# Patient Record
Sex: FEMALE | Race: WHITE | NOT HISPANIC OR LATINO | Employment: OTHER | URBAN - METROPOLITAN AREA
[De-identification: names, ages, dates, MRNs, and addresses within clinical notes are randomized per-mention and may not be internally consistent; named-entity substitution may affect disease eponyms.]

---

## 2023-02-24 ENCOUNTER — TELEPHONE (OUTPATIENT)
Dept: ORTHOPEDICS | Facility: CLINIC | Age: 83
End: 2023-02-24
Payer: MEDICARE

## 2023-02-24 DIAGNOSIS — M25.561 RIGHT KNEE PAIN, UNSPECIFIED CHRONICITY: Primary | ICD-10-CM

## 2023-02-27 ENCOUNTER — OFFICE VISIT (OUTPATIENT)
Dept: ORTHOPEDICS | Facility: CLINIC | Age: 83
End: 2023-02-27
Payer: MEDICARE

## 2023-02-27 ENCOUNTER — HOSPITAL ENCOUNTER (OUTPATIENT)
Dept: RADIOLOGY | Facility: HOSPITAL | Age: 83
Discharge: HOME | End: 2023-02-27
Attending: ORTHOPAEDIC SURGERY
Payer: MEDICARE

## 2023-02-27 VITALS — HEIGHT: 63 IN | WEIGHT: 130 LBS | BODY MASS INDEX: 23.04 KG/M2

## 2023-02-27 DIAGNOSIS — M25.561 RIGHT KNEE PAIN, UNSPECIFIED CHRONICITY: ICD-10-CM

## 2023-02-27 DIAGNOSIS — G89.29 CHRONIC PAIN OF RIGHT KNEE: Primary | ICD-10-CM

## 2023-02-27 DIAGNOSIS — M25.561 CHRONIC PAIN OF RIGHT KNEE: Primary | ICD-10-CM

## 2023-02-27 DIAGNOSIS — M17.11 PRIMARY OSTEOARTHRITIS OF RIGHT KNEE: ICD-10-CM

## 2023-02-27 PROCEDURE — 1159F PR MEDICATION LIST DOCUMENTED IN MEDICAL RECORD: ICD-10-PCS | Mod: CPTII,S$GLB,, | Performed by: ORTHOPAEDIC SURGERY

## 2023-02-27 PROCEDURE — 73564 X-RAY EXAM KNEE 4 OR MORE: CPT | Mod: 26,RT,, | Performed by: RADIOLOGY

## 2023-02-27 PROCEDURE — 99999 PR PBB SHADOW E&M-EST. PATIENT-LVL III: ICD-10-PCS | Mod: PBBFAC,,, | Performed by: ORTHOPAEDIC SURGERY

## 2023-02-27 PROCEDURE — 1101F PT FALLS ASSESS-DOCD LE1/YR: CPT | Mod: CPTII,S$GLB,, | Performed by: ORTHOPAEDIC SURGERY

## 2023-02-27 PROCEDURE — 1126F AMNT PAIN NOTED NONE PRSNT: CPT | Mod: CPTII,S$GLB,, | Performed by: ORTHOPAEDIC SURGERY

## 2023-02-27 PROCEDURE — 3288F PR FALLS RISK ASSESSMENT DOCUMENTED: ICD-10-PCS | Mod: CPTII,S$GLB,, | Performed by: ORTHOPAEDIC SURGERY

## 2023-02-27 PROCEDURE — 3288F FALL RISK ASSESSMENT DOCD: CPT | Mod: CPTII,S$GLB,, | Performed by: ORTHOPAEDIC SURGERY

## 2023-02-27 PROCEDURE — 1159F MED LIST DOCD IN RCRD: CPT | Mod: CPTII,S$GLB,, | Performed by: ORTHOPAEDIC SURGERY

## 2023-02-27 PROCEDURE — 99204 OFFICE O/P NEW MOD 45 MIN: CPT | Mod: S$GLB,,, | Performed by: ORTHOPAEDIC SURGERY

## 2023-02-27 PROCEDURE — 99204 PR OFFICE/OUTPT VISIT, NEW, LEVL IV, 45-59 MIN: ICD-10-PCS | Mod: S$GLB,,, | Performed by: ORTHOPAEDIC SURGERY

## 2023-02-27 PROCEDURE — 73564 XR KNEE ORTHO RIGHT WITH FLEXION: ICD-10-PCS | Mod: 26,RT,, | Performed by: RADIOLOGY

## 2023-02-27 PROCEDURE — 1101F PR PT FALLS ASSESS DOC 0-1 FALLS W/OUT INJ PAST YR: ICD-10-PCS | Mod: CPTII,S$GLB,, | Performed by: ORTHOPAEDIC SURGERY

## 2023-02-27 PROCEDURE — 1126F PR PAIN SEVERITY QUANTIFIED, NO PAIN PRESENT: ICD-10-PCS | Mod: CPTII,S$GLB,, | Performed by: ORTHOPAEDIC SURGERY

## 2023-02-27 PROCEDURE — 99999 PR PBB SHADOW E&M-EST. PATIENT-LVL III: CPT | Mod: PBBFAC,,, | Performed by: ORTHOPAEDIC SURGERY

## 2023-02-27 PROCEDURE — 73562 XR KNEE ORTHO RIGHT WITH FLEXION: ICD-10-PCS | Mod: 26,LT,, | Performed by: RADIOLOGY

## 2023-02-27 PROCEDURE — 73564 X-RAY EXAM KNEE 4 OR MORE: CPT | Mod: TC,RT

## 2023-02-27 PROCEDURE — 73562 X-RAY EXAM OF KNEE 3: CPT | Mod: 26,LT,, | Performed by: RADIOLOGY

## 2023-02-27 RX ORDER — ATORVASTATIN CALCIUM 10 MG/1
1 TABLET, FILM COATED ORAL DAILY
COMMUNITY
Start: 2022-05-20

## 2023-02-27 RX ORDER — ASPIRIN 81 MG/1
81 TABLET ORAL
COMMUNITY

## 2023-02-27 RX ORDER — LATANOPROST 50 UG/ML
SOLUTION/ DROPS OPHTHALMIC
COMMUNITY
Start: 2023-02-20

## 2023-02-27 RX ORDER — LEVOTHYROXINE SODIUM 25 UG/1
25 TABLET ORAL
COMMUNITY
Start: 2023-02-02

## 2023-02-27 RX ORDER — AMLODIPINE BESYLATE 2.5 MG/1
2.5 TABLET ORAL
COMMUNITY
Start: 2023-02-02

## 2023-02-27 RX ORDER — CHOLECALCIFEROL (VITAMIN D3) 25 MCG
1000 TABLET ORAL
COMMUNITY

## 2023-02-27 RX ORDER — DIPHENHYDRAMINE HCL 25 MG
25 CAPSULE ORAL NIGHTLY PRN
COMMUNITY

## 2023-02-27 RX ORDER — TRIAMCINOLONE ACETONIDE 1 MG/G
CREAM TOPICAL DAILY PRN
COMMUNITY

## 2023-02-27 RX ORDER — GUAIFENESIN 600 MG/1
600 TABLET, EXTENDED RELEASE ORAL DAILY PRN
COMMUNITY

## 2023-02-27 NOTE — PROGRESS NOTES
"      Patient ID: Jocelyn Hutton  YOB: 1940  MRN: [id number]    Chief Complaint: Pain of the Right Knee    Referred By: Makenzie Carvajal (current pt)    History of Present Illness: Jocelyn Hutton is a  82 y.o. female   retiree with a chief complaint of Pain of the Right Knee    Meghan is here today with right knee pain. She denies having any pain today. Her pain began 2/22/23 when she went to kneel down at [employer] and her knee popped. She has had pain since then and could not put full weight on her leg for a while. She has no prior hx of any sx, CSI, or PT. She denies any numbness, weakness, swelling, limited ROM. Her knee sometimes will still pop. Her pain is worsened by standing and kneeling. Her pain is alleviated by her knee "popping".     HPI    Past Medical History:   Past Medical History:   Diagnosis Date    Hypertension     Thyroid disease      History reviewed. No pertinent surgical history.  History reviewed. No pertinent family history.  Social History     Socioeconomic History    Marital status:    Tobacco Use    Smoking status: Never    Smokeless tobacco: Never   Substance and Sexual Activity    Alcohol use: Not Currently    Drug use: Never     Medication List with Changes/Refills   Current Medications    AMLODIPINE (NORVASC) 2.5 MG TABLET    Take 2.5 mg by mouth.    ASPIRIN (ECOTRIN) 81 MG EC TABLET    Take 81 mg by mouth.    ATORVASTATIN (LIPITOR) 10 MG TABLET    Take 1 tablet by mouth once daily.    DIPHENHYDRAMINE (BENADRYL) 25 MG CAPSULE    Take 25 mg by mouth nightly as needed.    GUAIFENESIN (MUCINEX) 600 MG 12 HR TABLET    Take 600 mg by mouth daily as needed.    LATANOPROST 0.005 % OPHTHALMIC SOLUTION    Place into both eyes.    LEVOTHYROXINE (SYNTHROID) 25 MCG TABLET    Take 25 mcg by mouth.    MULTIVITAMIN WITH MINERALS TABLET    Take 1 tablet by mouth once daily.    TRIAMCINOLONE ACETONIDE 0.1% (KENALOG) 0.1 % CREAM    Apply topically daily as needed.    VITAMIN D (VITAMIN D3) " 1000 UNITS TAB    Take 1,000 Units by mouth.     Review of patient's allergies indicates:  No Known Allergies  ROS    Physical Exam:   Body mass index is 23.03 kg/m².  There were no vitals filed for this visit.   GENERAL: Well appearing, appropriate for stated age, no acute distress.  CARDIOVASCULAR: Pulses regular by peripheral palpation.  PULMONARY: Respirations are even and non-labored.  NEURO: Awake, alert, and oriented x 3.  PSYCH: Mood & affect are appropriate.  HEENT: Head is normocephalic and atraumatic.            Right Knee Exam     Inspection   Effusion: absent    Tenderness   The patient is tender to palpation of the lateral joint line.    Crepitus   The patient has crepitus of the patella and lateral joint line.    Range of Motion   Extension:  0   Flexion:  100     Tests   Meniscus   Miguel:   Lateral - positive    Other   Sensation: normal    Comments:  Intact EHL, FHL, gastrocsoleus, and tibialis anterior. Sensation intact to light touch in superficial peroneal, deep peroneal, tibial, sural, and saphenous nerve distributions. Foot warm and well perfused with capillary refill of less than 2 seconds and palpable pedal pulses.      Muscle Strength   Right Lower Extremity   Hip Abduction: 5/5   Quadriceps:  5/5   Hamstrin/5     Vascular Exam     Right Pulses  Dorsalis Pedis:      2+  Posterior Tibial:      2+        Imaging:    X-ray Knee Ortho Right with Flexion  Narrative: EXAMINATION:  XR KNEE ORTHO RIGHT WITH FLEXION    CLINICAL HISTORY:  Pain in right knee    TECHNIQUE:  AP standing as well as PA flexion standing and Merchant views of both knees were performed.  A lateral view of the right knee is also performed.    COMPARISON:  None.    FINDINGS:  Flexion standing views demonstrate moderate lateral compartment joint space narrowing and osteophytic lipping bilaterally.  Minimal lateral tilting bilaterally on patellar views.  Joint spaces maintained.  Soft tissues normal.  Impression:  Degenerative change as above.    Electronically signed by: Sergey Braga MD  Date:    02/27/2023  Time:    09:13      Relevant imaging results reviewed and interpreted by me, discussed with the patient and / or family today.     Other Tests:         Patient Instructions   Assessment:  Jocelyn Hutton is a  82 y.o. female   retiree with a chief complaint of Pain of the Right Knee    Chronic right knee pain with mechanical symptoms     Encounter Diagnoses   Name Primary?    Chronic pain of right knee Yes    Primary osteoarthritis of right knee       Plan:  MRI of right knee in [facility]     Follow-up: AFTER MRI or sooner if there are any problems between now and then.    Leave Review:   Google: Leave Google Review  Healthgrades: Leave Healthgrades Review    After Hours Number: [phone]       Provider Note/Medical Decision Making:       I discussed worrisome and red flag signs and symptoms with the patient. The patient expressed understanding and agreed to alert me immediately or to go to the emergency room if they experience any of these.   Treatment plan was developed with input from the patient/family, and they expressed understanding and agreement with the plan. All questions were answered today.          Chapincito Hoffmann MD  Orthopaedic Surgery & Sports Medicine       Disclaimer: This note was prepared using a voice recognition system and is likely to have sound alike errors within the text.

## 2023-02-27 NOTE — PATIENT INSTRUCTIONS
Assessment:  Jocelyn Hutton is a  82 y.o. female   retiree with a chief complaint of Pain of the Right Knee    Chronic right knee pain with mechanical symptoms     Encounter Diagnoses   Name Primary?    Chronic pain of right knee Yes    Primary osteoarthritis of right knee       Plan:  MRI of right knee in [facility]     Follow-up: AFTER MRI or sooner if there are any problems between now and then.    Leave Review:   Google: Leave Google Review  Healthgrades: Leave Healthgrades Review    After Hours Number: [phone]

## 2023-02-27 NOTE — PROGRESS NOTES
"      Patient ID: Jocelyn Hutton  YOB: 1940  MRN: [id number]    Chief Complaint: Pain of the Right Knee      Referred By: Makenzie Carvajal (current pt)    History of Present Illness: Jocelyn Hutton is a  82 y.o. female   retiree with a chief complaint of Pain of the Right Knee    Meghan is here today with right knee pain. She denies having any pain today. Her pain began 2/22/23 when she went to kneel down at [employer] and her knee popped. She has had pain since then and could not put full weight on her leg for a while. She has no prior hx of any sx, CSI, or PT. She denies any numbness, weakness, swelling, limited ROM. Her knee sometimes will still pop. Her pain is worsened by standing and kneeling. Her pain is alleviated by her knee "popping".     HPI    Past Medical History:   Past Medical History:   Diagnosis Date    Hypertension     Thyroid disease      History reviewed. No pertinent surgical history.  History reviewed. No pertinent family history.  Social History     Socioeconomic History    Marital status:    Tobacco Use    Smoking status: Never    Smokeless tobacco: Never   Substance and Sexual Activity    Alcohol use: Not Currently    Drug use: Never     Medication List with Changes/Refills   Current Medications    AMLODIPINE (NORVASC) 2.5 MG TABLET    Take 2.5 mg by mouth.    ASPIRIN (ECOTRIN) 81 MG EC TABLET    Take 81 mg by mouth.    ATORVASTATIN (LIPITOR) 10 MG TABLET    Take 1 tablet by mouth once daily.    DIPHENHYDRAMINE (BENADRYL) 25 MG CAPSULE    Take 25 mg by mouth nightly as needed.    GUAIFENESIN (MUCINEX) 600 MG 12 HR TABLET    Take 600 mg by mouth daily as needed.    LATANOPROST 0.005 % OPHTHALMIC SOLUTION    Place into both eyes.    LEVOTHYROXINE (SYNTHROID) 25 MCG TABLET    Take 25 mcg by mouth.    MULTIVITAMIN WITH MINERALS TABLET    Take 1 tablet by mouth once daily.    TRIAMCINOLONE ACETONIDE 0.1% (KENALOG) 0.1 % CREAM    Apply topically daily as needed.    VITAMIN D (VITAMIN D3) " 1000 UNITS TAB    Take 1,000 Units by mouth.     Review of patient's allergies indicates:  No Known Allergies  ROS    Physical Exam:   Body mass index is 23.03 kg/m².  There were no vitals filed for this visit.   GENERAL: Well appearing, appropriate for stated age, no acute distress.  CARDIOVASCULAR: Pulses regular by peripheral palpation.  PULMONARY: Respirations are even and non-labored.  NEURO: Awake, alert, and oriented x 3.  PSYCH: Mood & affect are appropriate.  HEENT: Head is normocephalic and atraumatic.  Ortho/SPM Exam  ***    Imaging:    X-ray Knee Ortho Right with Flexion  Narrative: EXAMINATION:  XR KNEE ORTHO RIGHT WITH FLEXION    CLINICAL HISTORY:  Pain in right knee    TECHNIQUE:  AP standing as well as PA flexion standing and Merchant views of both knees were performed.  A lateral view of the right knee is also performed.    COMPARISON:  None.    FINDINGS:  Flexion standing views demonstrate moderate lateral compartment joint space narrowing and osteophytic lipping bilaterally.  Minimal lateral tilting bilaterally on patellar views.  Joint spaces maintained.  Soft tissues normal.  Impression: Degenerative change as above.    Electronically signed by: Sergey Braga MD  Date:    02/27/2023  Time:    09:13    ***  Relevant imaging results reviewed and interpreted by me, discussed with the patient and / or family today. ***    Other Tests:     ***    There are no Patient Instructions on file for this visit.  Provider Note/Medical Decision Making: ***      I discussed worrisome and red flag signs and symptoms with the patient. The patient expressed understanding and agreed to alert me immediately or to go to the emergency room if they experience any of these.   Treatment plan was developed with input from the patient/family, and they expressed understanding and agreement with the plan. All questions were answered today.          Chapincito Hoffmann MD  Orthopaedic Surgery & Sports Medicine        Disclaimer: This note was prepared using a voice recognition system and is likely to have sound alike errors within the text.

## 2023-03-03 ENCOUNTER — HOSPITAL ENCOUNTER (OUTPATIENT)
Dept: RADIOLOGY | Facility: HOSPITAL | Age: 83
Discharge: HOME | End: 2023-03-03
Attending: ORTHOPAEDIC SURGERY
Payer: MEDICARE

## 2023-03-03 DIAGNOSIS — M25.561 CHRONIC PAIN OF RIGHT KNEE: ICD-10-CM

## 2023-03-03 DIAGNOSIS — G89.29 CHRONIC PAIN OF RIGHT KNEE: ICD-10-CM

## 2023-03-03 PROCEDURE — 73721 MRI KNEE WITHOUT CONTRAST RIGHT: ICD-10-PCS | Mod: 26,RT,, | Performed by: RADIOLOGY

## 2023-03-03 PROCEDURE — 73721 MRI JNT OF LWR EXTRE W/O DYE: CPT | Mod: 26,RT,, | Performed by: RADIOLOGY

## 2023-03-03 PROCEDURE — 73721 MRI JNT OF LWR EXTRE W/O DYE: CPT | Mod: TC,PO,RT

## 2023-03-06 ENCOUNTER — OFFICE VISIT (OUTPATIENT)
Dept: ORTHOPEDICS | Facility: CLINIC | Age: 83
End: 2023-03-06
Payer: MEDICARE

## 2023-03-06 DIAGNOSIS — M17.11 PRIMARY OSTEOARTHRITIS OF RIGHT KNEE: Primary | ICD-10-CM

## 2023-03-06 DIAGNOSIS — S83.271D COMPLEX TEAR OF LATERAL MENISCUS OF RIGHT KNEE AS CURRENT INJURY, SUBSEQUENT ENCOUNTER: ICD-10-CM

## 2023-03-06 DIAGNOSIS — G89.29 CHRONIC PAIN OF RIGHT KNEE: ICD-10-CM

## 2023-03-06 DIAGNOSIS — M25.561 CHRONIC PAIN OF RIGHT KNEE: ICD-10-CM

## 2023-03-06 PROCEDURE — 99214 OFFICE O/P EST MOD 30 MIN: CPT | Mod: S$GLB,,, | Performed by: ORTHOPAEDIC SURGERY

## 2023-03-06 PROCEDURE — 1101F PT FALLS ASSESS-DOCD LE1/YR: CPT | Mod: CPTII,S$GLB,, | Performed by: ORTHOPAEDIC SURGERY

## 2023-03-06 PROCEDURE — 99214 PR OFFICE/OUTPT VISIT, EST, LEVL IV, 30-39 MIN: ICD-10-PCS | Mod: S$GLB,,, | Performed by: ORTHOPAEDIC SURGERY

## 2023-03-06 PROCEDURE — 3288F FALL RISK ASSESSMENT DOCD: CPT | Mod: CPTII,S$GLB,, | Performed by: ORTHOPAEDIC SURGERY

## 2023-03-06 PROCEDURE — 1159F PR MEDICATION LIST DOCUMENTED IN MEDICAL RECORD: ICD-10-PCS | Mod: CPTII,S$GLB,, | Performed by: ORTHOPAEDIC SURGERY

## 2023-03-06 PROCEDURE — 1101F PR PT FALLS ASSESS DOC 0-1 FALLS W/OUT INJ PAST YR: ICD-10-PCS | Mod: CPTII,S$GLB,, | Performed by: ORTHOPAEDIC SURGERY

## 2023-03-06 PROCEDURE — 3288F PR FALLS RISK ASSESSMENT DOCUMENTED: ICD-10-PCS | Mod: CPTII,S$GLB,, | Performed by: ORTHOPAEDIC SURGERY

## 2023-03-06 PROCEDURE — 99999 PR PBB SHADOW E&M-EST. PATIENT-LVL III: ICD-10-PCS | Mod: PBBFAC,,, | Performed by: ORTHOPAEDIC SURGERY

## 2023-03-06 PROCEDURE — 99999 PR PBB SHADOW E&M-EST. PATIENT-LVL III: CPT | Mod: PBBFAC,,, | Performed by: ORTHOPAEDIC SURGERY

## 2023-03-06 PROCEDURE — 1159F MED LIST DOCD IN RCRD: CPT | Mod: CPTII,S$GLB,, | Performed by: ORTHOPAEDIC SURGERY

## 2023-03-06 NOTE — PATIENT INSTRUCTIONS
Assessment:  Jocelyn Hutton is a  82 y.o. 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    Primary osteoarthritis of right knee Yes    Chronic pain of right knee     Complex tear of lateral meniscus of right knee as current injury, subsequent encounter         Plan:  Defer corticosteroid injection today  Discussed surgical options but at this point recommend non-operative management as patient's symptoms and activity level have improved  I do think she will benefit from PT and we will get her a script to [facility] in [place] where she has had good experiences before  Discussed activity modifications and avoiding things that worsen pain and cause popping    Follow-up: 2 months with Jodi Holliday PA-C or sooner if there are any problems between now and then.    Leave Review:   Google: Leave Google Review  Healthgrades: Leave Healthgrades Review    After Hours Number: [phone]

## 2023-03-06 NOTE — PROGRESS NOTES
"      Patient ID: Jocelyn Hutton  YOB: 1940  MRN: [id number]    Chief Complaint: Follow-up of the Right Knee    Referred By: Makenzie Carvajal (current pt)/Sudhakar cooley    History of Present Illness: Joeclyn Hutton is a  82 y.o. 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 She reports no changes in her knee pain. She reports no new popping, but has had popping randomly of her knee for years.     Chacha is here today with right knee pain. She denies having any pain today. Her pain began 2/22/23 when she went to kneel down at [employer] and her knee popped. She has had pain since then and could not put full weight on her leg for a while. She has no prior hx of any sx, CSI, or PT. She denies any numbness, weakness, swelling, limited ROM. Her knee sometimes will still pop. Her pain is worsened by standing and kneeling. Her pain is alleviated by her knee "popping".     HPI    Past Medical History:   Past Medical History:   Diagnosis Date    Hypertension     Thyroid disease      History reviewed. No pertinent surgical history.  History reviewed. No pertinent family history.  Social History     Socioeconomic History    Marital status:    Tobacco Use    Smoking status: Never    Smokeless tobacco: Never   Substance and Sexual Activity    Alcohol use: Not Currently    Drug use: Never     Medication List with Changes/Refills   Current Medications    AMLODIPINE (NORVASC) 2.5 MG TABLET    Take 2.5 mg by mouth.    ASPIRIN (ECOTRIN) 81 MG EC TABLET    Take 81 mg by mouth.    ATORVASTATIN (LIPITOR) 10 MG TABLET    Take 1 tablet by mouth once daily.    DIPHENHYDRAMINE (BENADRYL) 25 MG CAPSULE    Take 25 mg by mouth nightly as needed.    GUAIFENESIN (MUCINEX) 600 MG 12 HR TABLET    Take 600 mg by mouth daily as needed.    LATANOPROST 0.005 % OPHTHALMIC SOLUTION    Place into both eyes.    LEVOTHYROXINE (SYNTHROID) 25 MCG " TABLET    Take 25 mcg by mouth.    MULTIVITAMIN WITH MINERALS TABLET    Take 1 tablet by mouth once daily.    TRIAMCINOLONE ACETONIDE 0.1% (KENALOG) 0.1 % CREAM    Apply topically daily as needed.    VITAMIN D (VITAMIN D3) 1000 UNITS TAB    Take 1,000 Units by mouth.     Review of patient's allergies indicates:  No Known Allergies  ROS    Physical Exam:   There is no height or weight on file to calculate BMI.  There were no vitals filed for this visit.   GENERAL: Well appearing, appropriate for stated age, no acute distress.  CARDIOVASCULAR: Pulses regular by peripheral palpation.  PULMONARY: Respirations are even and non-labored.  NEURO: Awake, alert, and oriented x 3.  PSYCH: Mood & affect are appropriate.  HEENT: Head is normocephalic and atraumatic.            Right Knee Exam     Inspection   Effusion: absent    Tenderness   The patient is tender to palpation of the lateral joint line.    Crepitus   The patient has crepitus of the patella and lateral joint line.    Range of Motion   Extension:  0   Flexion:  100     Tests   Meniscus   Miguel:   Lateral - positive    Other   Sensation: normal    Comments:  Intact EHL, FHL, gastrocsoleus, and tibialis anterior. Sensation intact to light touch in superficial peroneal, deep peroneal, tibial, sural, and saphenous nerve distributions. Foot warm and well perfused with capillary refill of less than 2 seconds and palpable pedal pulses.      Muscle Strength   Right Lower Extremity   Hip Abduction: 5/5   Quadriceps:  5/5   Hamstrin/5     Vascular Exam     Right Pulses  Dorsalis Pedis:      2+  Posterior Tibial:      2+        Imaging:    MRI Knee Without Contrast Right  Narrative: EXAMINATION:  MRI KNEE WITHOUT CONTRAST RIGHT    CLINICAL HISTORY:  Meniscal tear, untreated, new symptoms;Pain in right knee    TECHNIQUE:  Multiplanar, multisequence images were performed about the right knee.  No contrast was administered.    COMPARISON:  None    FINDINGS:  Cruciate  ligaments and collateral ligaments are intact.  Normal extensor mechanism.    Mild intrasubstance degenerative signal the posterior and body medial meniscus.  No discrete tear.    Low-grade free edge blunting/radial tearing posterior horn lateral meniscus.  Degenerative free edge radial tearing throughout the body lateral meniscus which is extruded beyond the joint line.  Degenerate maceration of the anterior horn.    Mild chondral thinning throughout the weight-bearing surface of the medial compartment.  Full-thickness chondral erosion posterior weight-bearing surface lateral femoral condyle measures 2 cm.  Adjacent full-thickness chondral erosion posterolateral tibial plateau measures 1.6 cm.    Patellofemoral articular cartilage well-preserved.    Small joint effusion.  Small Baker's cyst.  No intra-articular loose body.  Bony architecture marrow signal normal.  Supporting soft tissues and musculature are normal.  Impression: 1. Free edge degenerative radial tearing posterior horn and body lateral meniscus, which is extruded beyond the lateral joint line.  2. Full-thickness chondral erosions posterior weight-bearing surfaces of the lateral compartment.  3. Small effusion.  Small Baker's cyst.    Electronically signed by: Kennedy Hartman  Date:    03/03/2023  Time:    16:53      Relevant imaging results reviewed and interpreted by me, discussed with the patient and / or family today.     Other Tests:         Patient Instructions   Assessment:  Jocelyn Hutton is a  82 y.o. 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    Primary osteoarthritis of right knee Yes    Chronic pain of right knee     Complex tear of lateral meniscus of right knee as current injury, subsequent encounter         Plan:  Defer corticosteroid  injection today  Discussed surgical options but at this point recommend non-operative management as patient's symptoms and activity level have improved  I do think she will benefit from PT and we will get her a script to [facility] in [place] where she has had good experiences before  Discussed activity modifications and avoiding things that worsen pain and cause popping    Follow-up: 2 months with Jodi Holliday PA-C or sooner if there are any problems between now and then.    Leave Review:   Google: Leave Google Review  Healthgrades: Leave Healthgrades Review    After Hours Number: [phone]       Provider Note/Medical Decision Making:  Patient has had some mechanical symptoms specifically laterally in her knee which are consistent with her findings of meniscus tear on that side.  She has significant chondrosis on that side too.  She states that since the last time she saw she is actually had no pain and mechanical symptoms she feels like she is doing much better.  In this case we did discuss surgery but it did not think surgery is indicated if she is completely asymptomatic.  We talked about her cartilage loss and arthritis and I do think she would benefit from some physical therapy.  She is going to modify her activities to try to avoid the popping and the pain and see how she does.  We could consider corticosteroid injection in the future which she deferred today but may have a role depending on how she does.      I discussed worrisome and red flag signs and symptoms with the patient. The patient expressed understanding and agreed to alert me immediately or to go to the emergency room if they experience any of these.   Treatment plan was developed with input from the patient/family, and they expressed understanding and agreement with the plan. All questions were answered today.          Chapincito Hoffmann MD  Orthopaedic Surgery & Sports Medicine       Disclaimer: This note was prepared using a voice  recognition system and is likely to have sound alike errors within the text.       I, Milagros Denny, acted as a scribe for Chapincito Hoffmann MD for the duration of this office visit.

## 2023-05-08 ENCOUNTER — OFFICE VISIT (OUTPATIENT)
Dept: ORTHOPEDICS | Facility: CLINIC | Age: 83
End: 2023-05-08
Payer: MEDICARE

## 2023-05-08 VITALS — WEIGHT: 130.06 LBS | BODY MASS INDEX: 23.04 KG/M2 | HEIGHT: 63 IN

## 2023-05-08 DIAGNOSIS — G89.29 CHRONIC PAIN OF RIGHT KNEE: ICD-10-CM

## 2023-05-08 DIAGNOSIS — M17.11 PRIMARY OSTEOARTHRITIS OF RIGHT KNEE: Primary | ICD-10-CM

## 2023-05-08 DIAGNOSIS — M25.561 CHRONIC PAIN OF RIGHT KNEE: ICD-10-CM

## 2023-05-08 DIAGNOSIS — S83.271D COMPLEX TEAR OF LATERAL MENISCUS OF RIGHT KNEE AS CURRENT INJURY, SUBSEQUENT ENCOUNTER: ICD-10-CM

## 2023-05-08 DIAGNOSIS — M25.561 RIGHT KNEE PAIN, UNSPECIFIED CHRONICITY: ICD-10-CM

## 2023-05-08 PROCEDURE — 99213 OFFICE O/P EST LOW 20 MIN: CPT | Mod: S$GLB,,, | Performed by: PHYSICIAN ASSISTANT

## 2023-05-08 PROCEDURE — 1101F PT FALLS ASSESS-DOCD LE1/YR: CPT | Mod: CPTII,S$GLB,, | Performed by: PHYSICIAN ASSISTANT

## 2023-05-08 PROCEDURE — 3288F PR FALLS RISK ASSESSMENT DOCUMENTED: ICD-10-PCS | Mod: CPTII,S$GLB,, | Performed by: PHYSICIAN ASSISTANT

## 2023-05-08 PROCEDURE — 1159F PR MEDICATION LIST DOCUMENTED IN MEDICAL RECORD: ICD-10-PCS | Mod: CPTII,S$GLB,, | Performed by: PHYSICIAN ASSISTANT

## 2023-05-08 PROCEDURE — 1101F PR PT FALLS ASSESS DOC 0-1 FALLS W/OUT INJ PAST YR: ICD-10-PCS | Mod: CPTII,S$GLB,, | Performed by: PHYSICIAN ASSISTANT

## 2023-05-08 PROCEDURE — 1126F AMNT PAIN NOTED NONE PRSNT: CPT | Mod: CPTII,S$GLB,, | Performed by: PHYSICIAN ASSISTANT

## 2023-05-08 PROCEDURE — 99999 PR PBB SHADOW E&M-EST. PATIENT-LVL III: CPT | Mod: PBBFAC,,, | Performed by: PHYSICIAN ASSISTANT

## 2023-05-08 PROCEDURE — 99999 PR PBB SHADOW E&M-EST. PATIENT-LVL III: ICD-10-PCS | Mod: PBBFAC,,, | Performed by: PHYSICIAN ASSISTANT

## 2023-05-08 PROCEDURE — 3288F FALL RISK ASSESSMENT DOCD: CPT | Mod: CPTII,S$GLB,, | Performed by: PHYSICIAN ASSISTANT

## 2023-05-08 PROCEDURE — 99213 PR OFFICE/OUTPT VISIT, EST, LEVL III, 20-29 MIN: ICD-10-PCS | Mod: S$GLB,,, | Performed by: PHYSICIAN ASSISTANT

## 2023-05-08 PROCEDURE — 1159F MED LIST DOCD IN RCRD: CPT | Mod: CPTII,S$GLB,, | Performed by: PHYSICIAN ASSISTANT

## 2023-05-08 PROCEDURE — 1160F PR REVIEW ALL MEDS BY PRESCRIBER/CLIN PHARMACIST DOCUMENTED: ICD-10-PCS | Mod: CPTII,S$GLB,, | Performed by: PHYSICIAN ASSISTANT

## 2023-05-08 PROCEDURE — 1126F PR PAIN SEVERITY QUANTIFIED, NO PAIN PRESENT: ICD-10-PCS | Mod: CPTII,S$GLB,, | Performed by: PHYSICIAN ASSISTANT

## 2023-05-08 PROCEDURE — 1160F RVW MEDS BY RX/DR IN RCRD: CPT | Mod: CPTII,S$GLB,, | Performed by: PHYSICIAN ASSISTANT

## 2023-05-08 RX ORDER — DICLOFENAC SODIUM 10 MG/G
2 GEL TOPICAL 3 TIMES DAILY
Qty: 1 EACH | Refills: 1 | Status: SHIPPED | OUTPATIENT
Start: 2023-05-08

## 2023-05-08 NOTE — PATIENT INSTRUCTIONS
Assessment:  Jocelyn Hutton is a  82 y.o. 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    Primary osteoarthritis of right knee Yes    Chronic pain of right knee     Complex tear of lateral meniscus of right knee as current injury, subsequent encounter     Right knee pain, unspecified chronicity         Plan:  Continue at home exercises  Discussed if continue mechanical symptoms recheck or new pain of OA.   Recommended visco but deferred at this time    Follow-up: PRN or sooner if there are any problems between now and then.    Leave Review:   Google: Leave Google Review  Healthgrades: Leave Healthgrades Review    After Hours Number: [phone]

## 2023-05-08 NOTE — PROGRESS NOTES
Patient ID: Jocelyn Hutton  YOB: 1940  MRN: [id number]    Chief Complaint: Pain of the Right Knee      Referred By: Dr. Hoffmann    History of Present Illness: Jocelyn Hutton is a  82 y.o. 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 She has completed six weeks of physical therapy at [facility]. She feels like it has help a lot she reports a pain level of 0 out of 10. She says in physical therapy they discovered as long as she doesn't flex her knee to far backwards she can avoid most pain and flare ups.     HPI    Past Medical History:   Past Medical History:   Diagnosis Date    Hypertension     Thyroid disease      History reviewed. No pertinent surgical history.  History reviewed. No pertinent family history.  Social History     Socioeconomic History    Marital status:    Tobacco Use    Smoking status: Never    Smokeless tobacco: Never   Substance and Sexual Activity    Alcohol use: Not Currently    Drug use: Never    Sexual activity: Not Currently     Medication List with Changes/Refills   New Medications    DICLOFENAC SODIUM (VOLTAREN) 1 % GEL    Apply 2 g topically 3 (three) times daily.   Current Medications    AMLODIPINE (NORVASC) 2.5 MG TABLET    Take 2.5 mg by mouth.    ASPIRIN (ECOTRIN) 81 MG EC TABLET    Take 81 mg by mouth.    ATORVASTATIN (LIPITOR) 10 MG TABLET    Take 1 tablet by mouth once daily.    DIPHENHYDRAMINE (BENADRYL) 25 MG CAPSULE    Take 25 mg by mouth nightly as needed.    GUAIFENESIN (MUCINEX) 600 MG 12 HR TABLET    Take 600 mg by mouth daily as needed.    LATANOPROST 0.005 % OPHTHALMIC SOLUTION    Place into both eyes.    LEVOTHYROXINE (SYNTHROID) 25 MCG TABLET    Take 25 mcg by mouth.    MULTIVITAMIN WITH MINERALS TABLET    Take 1 tablet by mouth once daily.    TRIAMCINOLONE ACETONIDE 0.1% (KENALOG) 0.1 % CREAM    Apply topically daily as  needed.    VITAMIN D (VITAMIN D3) 1000 UNITS TAB    Take 1,000 Units by mouth.     Review of patient's allergies indicates:   Allergen Reactions    Brimonidine Rash     Review of Systems   Constitutional: Negative for chills and fever.   HENT:  Negative for sore throat.    Eyes:  Negative for pain.   Cardiovascular:  Negative for chest pain and leg swelling.   Respiratory:  Negative for cough and shortness of breath.    Skin:  Negative for itching and rash.   Musculoskeletal:  Positive for joint pain and joint swelling.   Gastrointestinal:  Negative for abdominal pain, nausea and vomiting.   Genitourinary:  Negative for dysuria.   Neurological:  Negative for dizziness, numbness and paresthesias.     Physical Exam:   Body mass index is 23.04 kg/m².  There were no vitals filed for this visit.   GENERAL: Well appearing, appropriate for stated age, no acute distress.  CARDIOVASCULAR: Pulses regular by peripheral palpation.  PULMONARY: Respirations are even and non-labored.  NEURO: Awake, alert, and oriented x 3.  PSYCH: Mood & affect are appropriate.  HEENT: Head is normocephalic and atraumatic.  General    Nursing note and vitals reviewed.          Right Knee Exam   Right knee exam is normal.    Inspection   Effusion: absent    Tenderness   The patient is tender to palpation of the medial joint line.    Crepitus   The patient has crepitus of the patella.    Range of Motion   Extension:  0   Flexion:  120     Tests   Ligament Examination   Lachman: normal (-1 to 2mm)   PCL-Posterior Drawer: normal (0 to 2mm)     MCL - Valgus: normal (0 to 2mm)  LCL - Varus: normal    Other   Sensation: normal    Left Knee Exam   Left knee exam is normal.    Inspection   Effusion: absent    Tenderness   The patient is experiencing no tenderness.     Crepitus   The patient has crepitus of the patella.    Range of Motion   Extension:  0   Flexion:  120     Tests   Stability   Lachman: normal (-1 to 2mm)   PCL-Posterior Drawer: normal (0 to  2mm)  MCL - Valgus: normal (0 to 2mm)  LCL - Varus: normal (0 to 2mm)    Other   Sensation: normal    Muscle Strength   Right Lower Extremity   Hip Abduction: 5/5   Quadriceps:  5/5   Hamstrin/5   Left Lower Extremity   Hip Abduction: 5/5   Quadriceps:  5/5   Hamstrin/5     Vascular Exam     Right Pulses  Dorsalis Pedis:      2+  Posterior Tibial:      2+        Left Pulses  Dorsalis Pedis:      2+  Posterior Tibial:      2+      All compartments are soft and compressible. Calf soft non-tender. Intact EHL, FHL, gastroc soleus, and tibialis anterior. Sensation intact to light touch in superficial peroneal, deep peroneal, tibial, sural, and saphenous nerve distributions. Foot warm and well perfused with capillary refill of less than 2 seconds and palpable pedal pulses.      Imaging:    MRI Knee Without Contrast Right  Narrative: EXAMINATION:  MRI KNEE WITHOUT CONTRAST RIGHT    CLINICAL HISTORY:  Meniscal tear, untreated, new symptoms;Pain in right knee    TECHNIQUE:  Multiplanar, multisequence images were performed about the right knee.  No contrast was administered.    COMPARISON:  None    FINDINGS:  Cruciate ligaments and collateral ligaments are intact.  Normal extensor mechanism.    Mild intrasubstance degenerative signal the posterior and body medial meniscus.  No discrete tear.    Low-grade free edge blunting/radial tearing posterior horn lateral meniscus.  Degenerative free edge radial tearing throughout the body lateral meniscus which is extruded beyond the joint line.  Degenerate maceration of the anterior horn.    Mild chondral thinning throughout the weight-bearing surface of the medial compartment.  Full-thickness chondral erosion posterior weight-bearing surface lateral femoral condyle measures 2 cm.  Adjacent full-thickness chondral erosion posterolateral tibial plateau measures 1.6 cm.    Patellofemoral articular cartilage well-preserved.    Small joint effusion.  Small Baker's cyst.  No  intra-articular loose body.  Bony architecture marrow signal normal.  Supporting soft tissues and musculature are normal.  Impression: 1. Free edge degenerative radial tearing posterior horn and body lateral meniscus, which is extruded beyond the lateral joint line.  2. Full-thickness chondral erosions posterior weight-bearing surfaces of the lateral compartment.  3. Small effusion.  Small Baker's cyst.    Electronically signed by: Kennedy Hartman  Date:    03/03/2023  Time:    16:53      Relevant imaging results reviewed and interpreted by me, discussed with the patient and / or family today.     Other Tests:       Patient Instructions   Assessment:  Jocelyn Hutton is a  82 y.o. 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    Primary osteoarthritis of right knee Yes    Chronic pain of right knee     Complex tear of lateral meniscus of right knee as current injury, subsequent encounter     Right knee pain, unspecified chronicity         Plan:  Continue at home exercises  Discussed if continue mechanical symptoms recheck or new pain of OA.   Recommended visco but deferred at this time    Follow-up: PRN or sooner if there are any problems between now and then.    Leave Review:   Google: Leave Google Review  Healthgrades: Leave Healthgrades Review    After Hours Number: [phone]      Provider Note/Medical Decision Making:     I discussed worrisome and red flag signs and symptoms with the patient. The patient expressed understanding and agreed to alert me immediately or to go to the emergency room if they experience any of these.   Treatment plan was developed with input from the patient/family, and they expressed understanding and agreement with the plan. All questions were answered today.        Disclaimer: This note was prepared using a  voice recognition system and is likely to have sound alike errors within the text.

## 2025-03-03 ENCOUNTER — HOSPITAL ENCOUNTER (OUTPATIENT)
Dept: RADIOLOGY | Facility: HOSPITAL | Age: 85
Discharge: HOME | End: 2025-03-03
Attending: ORTHOPAEDIC SURGERY
Payer: MEDICARE

## 2025-03-03 ENCOUNTER — OFFICE VISIT (OUTPATIENT)
Dept: SPORTS MEDICINE | Facility: CLINIC | Age: 85
End: 2025-03-03
Payer: MEDICARE

## 2025-03-03 VITALS — BODY MASS INDEX: 23.04 KG/M2 | HEIGHT: 63 IN | WEIGHT: 130 LBS
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
## 2025-03-03 DIAGNOSIS — M25.562 ACUTE PAIN OF BOTH KNEES: ICD-10-CM

## 2025-03-03 DIAGNOSIS — M25.561 ACUTE PAIN OF BOTH KNEES: ICD-10-CM

## 2025-03-03 DIAGNOSIS — M25.562 PAIN IN BOTH KNEES, UNSPECIFIED CHRONICITY: ICD-10-CM

## 2025-03-03 DIAGNOSIS — S82.035A CLOSED NONDISPLACED TRANSVERSE FRACTURE OF LEFT PATELLA, INITIAL ENCOUNTER: ICD-10-CM

## 2025-03-03 DIAGNOSIS — S82.034A CLOSED NONDISPLACED TRANSVERSE FRACTURE OF RIGHT PATELLA, INITIAL ENCOUNTER: Primary | ICD-10-CM

## 2025-03-03 DIAGNOSIS — M25.561 PAIN IN BOTH KNEES, UNSPECIFIED CHRONICITY: ICD-10-CM

## 2025-03-03 PROCEDURE — 99999 PR PBB SHADOW E&M-EST. PATIENT-LVL III: CPT | Mod: PBBFAC,,, | Performed by: ORTHOPAEDIC SURGERY

## 2025-03-03 PROCEDURE — 73564 X-RAY EXAM KNEE 4 OR MORE: CPT | Mod: 26,50,, | Performed by: RADIOLOGY

## 2025-03-03 PROCEDURE — 73564 X-RAY EXAM KNEE 4 OR MORE: CPT | Mod: TC,50,PN

## 2025-03-03 PROCEDURE — 99215 OFFICE O/P EST HI 40 MIN: CPT | Mod: S$GLB,,, | Performed by: ORTHOPAEDIC SURGERY

## 2025-03-03 PROCEDURE — 3288F FALL RISK ASSESSMENT DOCD: CPT | Mod: CPTII,S$GLB,, | Performed by: ORTHOPAEDIC SURGERY

## 2025-03-03 PROCEDURE — 1101F PT FALLS ASSESS-DOCD LE1/YR: CPT | Mod: CPTII,S$GLB,, | Performed by: ORTHOPAEDIC SURGERY

## 2025-03-03 PROCEDURE — 1125F AMNT PAIN NOTED PAIN PRSNT: CPT | Mod: CPTII,S$GLB,, | Performed by: ORTHOPAEDIC SURGERY

## 2025-03-03 NOTE — PATIENT INSTRUCTIONS
Assessment:  Jocelyn Hutton is a  84 y.o. 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    Acute pain of both knees     Closed nondisplaced transverse fracture of right patella, initial encounter Yes    Closed nondisplaced transverse fracture of left patella, initial encounter     Fall, initial encounter         Plan:  Fitted for TROM brace on right knee. Needs to be locked in extension when walking, but okay to bend knee as much as tolerated when sitting. Fitted for hinge knee brace on left knee.   Recommend walker and limited weight bearing through Right knee   Given today   Order Bilateral knee CT Scan-Stat/Medically urgent  Avoid active knee extension on both knees but especially right knee.   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 Please reach out to us sooner if there are any problems between now and then.    About Dr. Shun Hoffmann's [employer]    Give us Feedback:   Google: Leave Google Review  Healthgrades: Leave Healthgrades Review    After Hours Number: [phone]

## 2025-03-04 ENCOUNTER — HOSPITAL ENCOUNTER (OUTPATIENT)
Dept: RADIOLOGY | Facility: HOSPITAL | Age: 85
Discharge: HOME | End: 2025-03-04
Attending: ORTHOPAEDIC SURGERY
Payer: MEDICARE

## 2025-03-04 DIAGNOSIS — S82.091A OTHER CLOSED FRACTURE OF RIGHT PATELLA, INITIAL ENCOUNTER: ICD-10-CM

## 2025-03-04 DIAGNOSIS — M25.561 PAIN IN BOTH KNEES, UNSPECIFIED CHRONICITY: ICD-10-CM

## 2025-03-04 DIAGNOSIS — M25.562 PAIN IN BOTH KNEES, UNSPECIFIED CHRONICITY: ICD-10-CM

## 2025-03-04 PROCEDURE — 73700 CT LOWER EXTREMITY W/O DYE: CPT | Mod: 26,50,, | Performed by: RADIOLOGY

## 2025-03-04 PROCEDURE — 73700 CT LOWER EXTREMITY W/O DYE: CPT | Mod: TC,50,PO

## 2025-03-05 ENCOUNTER — TELEPHONE (OUTPATIENT)
Dept: SPORTS MEDICINE | Facility: CLINIC | Age: 85
End: 2025-03-05
Payer: MEDICARE

## 2025-03-05 NOTE — TELEPHONE ENCOUNTER
Spoke with patient. She is going to use her walker and her son is going to adjust her toilet to be taller. She is doing well and getting around her house with the brace and crutches       ----- Message from Med Assistant Delacruz sent at 3/4/2025  4:46 PM CST -----  Contact: SELENA PALMER [[id number]]    ----- Message -----  From: Janay Mackey  Sent: 3/4/2025   4:38 PM CST  To: [facility] Staff    .Type:  Patient Requesting CallWho Called:SELENA PALMER [[id number]]Does the patient know what this is regarding?:Patient requesting a call back in regards to order for the attachment for her toiletWould the patient rather a call back or a response via [facility]? Call [facility] Call Back Number:.[phone] (home)  Additional Information:

## 2025-03-05 NOTE — PROGRESS NOTES
Patient ID: Jocelyn Hutton  YOB: 1940  MRN: [id number]    Chief Complaint: Pain of the Left Knee and Pain of the Right Knee      Referred By: Makenzie Carvajal (current pt)     History of Present Illness: Jocelyn Hutton is a  84 y.o. 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 She tripped over a lip in the street while walking to [employer], falling forward and landing on both knees. She also sustained minor abrasions on both hands from catching herself. The right knee appears more severely affected.    Her primary complaint is difficulty getting up from a seated position, particularly from lower surfaces like a commode. She reports minimal discomfort once standing or sitting, but the transition between positions is challenging. Stairs and inclines are especially problematic. She denies any numbness or tingling in her feet.    She reports swelling in both knees, more pronounced in the right. Initially, she experienced tightness behind the knee, which has since subsided. She also mentions soreness and stiffness in her upper left shin area.    For pain management, she has been taking extra-strength Tylenol, two tablets every six hours, which has been helpful. She also took ibuprofen once for inflammation. The first night after the fall, she had difficulty sleeping due to pain with movement, but sleep has improved since then.    This injury is significantly impacting her daily activities. She is unable to mow her lawn or perform her usual household tasks since the incident. She is concerned about an upcoming trip to [place] on the 24th, which she has been looking forward to for months.    Jocelyn denies any facial injuries, or injuries to other parts of her  body besides her knees and hands. She denies any balance issues or difficulty walking once standing. Torn meniscus in the right knee: Treated by Dr. Hoffmann.    MEDICATIONS:  - Tylenol Extra Strength: 2 tablets every 6 hours  - Ibuprofen: 1 tablet  - Baby aspirin: 81 mg occasionally    WORK STATUS:  - Jocelyn mentions mowing grass and taking care of her house  - Jocelyn lives alone  - Jocelyn has not been able to do these activities since the fall  - Jocelyn expresses concern about being able to use a lawnmower, stating she might be able to walk behind it but could not do much else    SOCIAL HISTORY:  - Jocelyn attends [employer]  - Jocelyn attends [employer] on Saturday afternoon, indicating they are likely Congregational, possibly Jainism      ROS:  Musculoskeletal: +joint pain  Neurological: -numbness  Psychiatric: +sleep difficulty         Past Medical History:   Past Medical History:   Diagnosis Date    Hypertension     Thyroid disease      No past surgical history on file.  No family history on file.  Social History[1]  Medication List with Changes/Refills   Current Medications    AMLODIPINE (NORVASC) 2.5 MG TABLET    Take 2.5 mg by mouth.    ASPIRIN (ECOTRIN) 81 MG EC TABLET    Take 81 mg by mouth.    DICLOFENAC SODIUM (VOLTAREN) 1 % GEL    Apply 2 g topically 3 (three) times daily.    DIPHENHYDRAMINE (BENADRYL) 25 MG CAPSULE    Take 25 mg by mouth nightly as needed.    GUAIFENESIN (MUCINEX) 600 MG 12 HR TABLET    Take 600 mg by mouth daily as needed.    LATANOPROST 0.005 % OPHTHALMIC SOLUTION    Place into both eyes.    LEVOTHYROXINE (SYNTHROID) 25 MCG TABLET    Take 25 mcg by mouth.    MULTIVITAMIN WITH MINERALS TABLET    Take 1 tablet by mouth once daily.    TRIAMCINOLONE ACETONIDE 0.1% (KENALOG) 0.1 % CREAM    Apply topically daily as needed.    VITAMIN D (VITAMIN D3) 1000 UNITS TAB    Take 1,000 Units by mouth.   Discontinued Medications    ATORVASTATIN (LIPITOR) 10 MG TABLET    Take 1 tablet by mouth once daily.     Review  of patient's allergies indicates:   Allergen Reactions    Brimonidine Rash     ROS    Physical Exam:   Body mass index is 23.03 kg/m².  There were no vitals filed for this visit.   GENERAL: Well appearing, appropriate for stated age, no acute distress.  CARDIOVASCULAR: Pulses regular by peripheral palpation.  PULMONARY: Respirations are even and non-labored.  NEURO: Awake, alert, and oriented x 3.  PSYCH: Mood & affect are appropriate.  HEENT: Head is normocephalic and atraumatic.            Right Knee Exam     Inspection   Effusion: present    Tenderness   The patient is tender to palpation of the patella.    Range of Motion   The patient has normal right knee ROM.  Extension:  0   Flexion:  120     Comments:  No extension lag  No step off deformity on patella    Intact EHL, FHL, gastrocsoleus, and tibialis anterior. Sensation intact to light touch in superficial peroneal, deep peroneal, tibial, sural, and saphenous nerve distributions. Foot warm and well perfused with capillary refill of less than 2 seconds and palpable pedal pulses.      Left Knee Exam     Inspection   Effusion: present    Tenderness   The patient tender to palpation of the patella.    Range of Motion   The patient has normal left knee ROM.  Extension:  0   Flexion:  120     Comments:  No extension lag  No step off deformity on patella  Intact EHL, FHL, gastrocsoleus, and tibialis anterior. Sensation intact to light touch in superficial peroneal, deep peroneal, tibial, sural, and saphenous nerve distributions. Foot warm and well perfused with capillary refill of less than 2 seconds and palpable pedal pulses.      Muscle Strength   Right Lower Extremity   Hip Abduction: 5/5   Quadriceps:  5/5   Hamstrin/5   Left Lower Extremity   Hip Abduction: 5/5   Quadriceps:  5/5   Hamstrin/5       Physical Exam    Musculoskeletal: PAIN ON STRAIGHTENING KNEES.  Right Knee: SWELLING IN RIGHT KNEE.  IMAGING:  - X-rays of both knees: Right knee X-ray  shows a fracture going across the patella. Left knee X-ray shows a possible small fracture or chip, but it's less clear. Multiple views were taken, including standing AP view and lateral views of both knees.             Imaging:     XR Results:  Results for orders placed during the hospital encounter of 03/03/25    X-ray Knee Ortho Bilateral with Flexion    Narrative  EXAM: XR KNEE ORTHO BILAT WITH FLEXION    CLINICAL HISTORY: Bilateral knee joint pain.    FINDINGS: Diffuse osteopenia.  Mild to moderate tricompartment degenerative changes greatest within the lateral compartments with joint space narrowing and sclerosis.  No fracture, dislocation, or other acute abnormality is identified.  Trace bilateral joint effusions.       No erosive change identified.  No loose intra-articular osteochondral bodies or osteochondral defect is evident.    Impression  No acute findings    Finalized on: 3/3/2025 6:05 PM By:  Zen Paul MD  [facility]# [id number]      2025-03-03 18:07:19.212     [facility]      MRI Results:  Results for orders placed during the hospital encounter of 03/03/23    MRI Knee Without Contrast Right    Narrative  EXAMINATION:  MRI KNEE WITHOUT CONTRAST RIGHT    CLINICAL HISTORY:  Meniscal tear, untreated, new symptoms;Pain in right knee    TECHNIQUE:  Multiplanar, multisequence images were performed about the right knee.  No contrast was administered.    COMPARISON:  None    FINDINGS:  Cruciate ligaments and collateral ligaments are intact.  Normal extensor mechanism.    Mild intrasubstance degenerative signal the posterior and body medial meniscus.  No discrete tear.    Low-grade free edge blunting/radial tearing posterior horn lateral meniscus.  Degenerative free edge radial tearing throughout the body lateral meniscus which is extruded beyond the joint line.  Degenerate maceration of the anterior horn.    Mild chondral thinning throughout the weight-bearing surface of the medial compartment.  Full-thickness  chondral erosion posterior weight-bearing surface lateral femoral condyle measures 2 cm.  Adjacent full-thickness chondral erosion posterolateral tibial plateau measures 1.6 cm.    Patellofemoral articular cartilage well-preserved.    Small joint effusion.  Small Baker's cyst.  No intra-articular loose body.  Bony architecture marrow signal normal.  Supporting soft tissues and musculature are normal.    Impression  1. Free edge degenerative radial tearing posterior horn and body lateral meniscus, which is extruded beyond the lateral joint line.  2. Full-thickness chondral erosions posterior weight-bearing surfaces of the lateral compartment.  3. Small effusion.  Small Baker's cyst.      Electronically signed by: Kennedy Hartman  Date:    03/03/2023  Time:    16:53      CT Results:  No results found for this or any previous visit.        Relevant imaging results reviewed and interpreted by me, discussed with the patient and / or family today.     Other Tests:         Assessment & Plan    PLAN SUMMARY:   CT of bilateral knees ordered to confirm fracture extent   Take OTC pain medication as needed   Apply ice to both knees (max 20 minutes at a time)   Take 81 mg aspirin daily   Provided right knee brace to be locked when weight-bearing   Provided different brace for left knee   Use walker when ambulating   Avoid stairs and excessive knee bending   Follow up within a week to review CT results   Discussed potential need for surgery pending CT results    KNEE FRACTURES AND OSTEOARTHRITIS:   Discussed potential need for surgery pending CT results.   Provided brace for right knee that locks when walking.   Provided different brace for left knee.   Keep right knee brace locked when weight-bearing or putting pressure on the lower extremity.   Do not bend knees excessively or put stress on kneecaps.   Avoid straightening right knee without brace support.   Apply ice to both knees for no more than 20 minutes at a time.    Ordered CT Bilateral Knees to confirm fracture extent and guide treatment.    GAIT AND MOBILITY ISSUES:   Use walker when ambulating.   Avoid stairs.   Perform foot and ankle exercises to maintain blood flow.    PAIN MANAGEMENT:   Take OTC pain medication as needed.   Take 1 baby aspirin (81 mg) daily.    POST-INJURY CARE:   Contact office if experiencing calf pain, swelling, or cramping.    FOLLOW-UP:   Follow up within the week to review CT results.             Patient Instructions   Assessment:  Jocelyn Hutton is a  84 y.o. 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    Acute pain of both knees     Closed nondisplaced transverse fracture of right patella, initial encounter Yes    Closed nondisplaced transverse fracture of left patella, initial encounter     Fall, initial encounter         Plan:  Fitted for TROM brace on right knee. Needs to be locked in extension when walking, but okay to bend knee as much as tolerated when sitting. Fitted for hinge knee brace on left knee.   Recommend walker and limited weight bearing through Right knee   Given today   Order Bilateral knee CT Scan-Stat/Medically urgent  Avoid active knee extension on both knees but especially right knee.   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 Please reach out to us sooner if there are any problems between now and then.    About Dr. Shun Hoffmann's Research & Publications    Give us Feedback:   Google: Leave Google Review  Healthgrades: Leave Healthgrades Review    After Hours Number: [phone]        Provider Note/Medical Decision Making:    Shun, and his team under his direction and supervision, spent 60 minutes in the care and care coordination of the patient on the day of service not otherwise reported.  This included face-to-face time with the patient.  This included reviewing imaging.  This included coordinating care.  This included documentation time.  This included orders for new advanced imaging.  This included arranging home health and DME.    I discussed worrisome and red flag signs and symptoms with the patient. The patient expressed understanding and agreed to alert me immediately or to go to the emergency room if they experience any of these.   Treatment plan was developed with input from the patient/family, and they expressed understanding and agreement with the plan. All questions were answered today.          Chapincito Hoffmann MD  Orthopaedic Surgery & Sports Medicine       Disclaimer: This note was prepared using a voice recognition system and is likely to have sound alike errors within the text.     This note was generated with the assistance of ambient listening technology. Verbal consent was obtained by the patient and accompanying visitor(s) for the recording of patient appointment to facilitate this note. I attest to having reviewed and edited the generated note for accuracy, though some syntax or spelling errors may persist. Please contact the author of this note for any clarification.    I, Milagros Denny, acted as a scribe for Chapincito Hoffmann MD for the duration of this office visit.         [1]   Social History  Socioeconomic History    Marital status:    Tobacco Use    Smoking status: Never    Smokeless tobacco: Never   Substance and Sexual Activity    Alcohol use: Not Currently    Drug use: Never    Sexual activity: Not Currently     Social Drivers of Health     Financial Resource Strain: Low Risk  (3/5/2025)    Overall Financial Resource Strain (CARDIA)     Difficulty of Paying Living Expenses: Not hard  at all   Food Insecurity: No Food Insecurity (3/5/2025)    Hunger Vital Sign     Worried About Running Out of Food in the Last Year: Never true     Ran Out of Food in the Last Year: Never true   Transportation Needs: No Transportation Needs (3/5/2025)    PRAPARE - Transportation     Lack of Transportation (Medical): No     Lack of Transportation (Non-Medical): No   Physical Activity: Patient Declined (3/5/2025)    Exercise Vital Sign     Days of Exercise per Week: Patient declined     Minutes of Exercise per Session: Patient declined   Stress: No Stress Concern Present (3/5/2025)    [employer] - Occupational Stress Questionnaire     Feeling of Stress : Not at all   Housing Stability: Low Risk  (3/5/2025)    Housing Stability Vital Sign     Unable to Pay for Housing in the Last Year: No     Number of Times Moved in the Last Year: 0     Homeless in the Last Year: No

## 2025-03-06 ENCOUNTER — OFFICE VISIT (OUTPATIENT)
Dept: SPORTS MEDICINE | Facility: CLINIC | Age: 85
End: 2025-03-06
Payer: MEDICARE

## 2025-03-06 ENCOUNTER — HOSPITAL ENCOUNTER (OUTPATIENT)
Dept: RADIOLOGY | Facility: HOSPITAL | Age: 85
Discharge: HOME | End: 2025-03-06
Attending: ORTHOPAEDIC SURGERY
Payer: MEDICARE

## 2025-03-06 VITALS — HEIGHT: 63 IN | BODY MASS INDEX: 23.04 KG/M2 | WEIGHT: 130.06 LBS

## 2025-03-06 DIAGNOSIS — S82.034A CLOSED NONDISPLACED TRANSVERSE FRACTURE OF RIGHT PATELLA, INITIAL ENCOUNTER: ICD-10-CM

## 2025-03-06 DIAGNOSIS — M79.661 RIGHT CALF PAIN: Primary | ICD-10-CM

## 2025-03-06 DIAGNOSIS — S82.035A CLOSED NONDISPLACED TRANSVERSE FRACTURE OF LEFT PATELLA, INITIAL ENCOUNTER: ICD-10-CM

## 2025-03-06 DIAGNOSIS — M79.661 RIGHT CALF PAIN: ICD-10-CM

## 2025-03-06 PROCEDURE — 1125F AMNT PAIN NOTED PAIN PRSNT: CPT | Mod: CPTII,S$GLB,, | Performed by: ORTHOPAEDIC SURGERY

## 2025-03-06 PROCEDURE — 97760 ORTHOTIC MGMT&TRAING 1ST ENC: CPT | Mod: S$GLB,,, | Performed by: ORTHOPAEDIC SURGERY

## 2025-03-06 PROCEDURE — 99215 OFFICE O/P EST HI 40 MIN: CPT | Mod: S$GLB,,, | Performed by: ORTHOPAEDIC SURGERY

## 2025-03-06 PROCEDURE — 93971 EXTREMITY STUDY: CPT | Mod: TC,RT

## 2025-03-06 PROCEDURE — 99999 PR PBB SHADOW E&M-EST. PATIENT-LVL III: CPT | Mod: PBBFAC,,, | Performed by: ORTHOPAEDIC SURGERY

## 2025-03-06 PROCEDURE — 93971 EXTREMITY STUDY: CPT | Mod: 26,RT,, | Performed by: RADIOLOGY

## 2025-03-06 PROCEDURE — 1159F MED LIST DOCD IN RCRD: CPT | Mod: CPTII,S$GLB,, | Performed by: ORTHOPAEDIC SURGERY

## 2025-03-06 NOTE — PROGRESS NOTES
Patient ID: Jocelyn Hutton  YOB: 1940  MRN: [id number]    Chief Complaint: Pain of the Right Knee and Pain of the Left Knee      Referred By: Makenzie Carvajal (current pt)     History of Present Illness: Jocelyn Hutton is a  84 y.o. 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 She reports feeling great overall but states she can hardly walk. Her knees are more sensitive, possibly due to reduced swelling. She notes a knot or bruise on her leg, which she believes may be from the brace or the fall. Her knees are sore to touch, but she feels she might be okay without the braces. She can straighten her left leg adequately, but the right leg has limited mobility. She denies pain when attempting to hold her leg straight. Jocelyn uses a walker at times, though she did not bring it to this appointment. She moves around independently at home but has difficulty getting up from a sitting position. She has been taking aspirin as instructed and vitamin D supplements. This is her first experience with broken bones. She had her first CT for this condition. She has been sleeping with her braces on and finds it more comfortable to sleep in a lounge chair.     Jocelyn denies any history of blood clots. Jocelyn has been using a walker for mobility, though not consistently.  Jocelyn has been wearing knee braces on both legs, with the right one locked straight and the left one allowing some bending.  Jocelyn has been doing ankle and foot exercises.    MEDICATIONS:  - Aspirin  - Vitamin D      ROS:  Genitourinary: -hematuria  Musculoskeletal: +joint pain         Recall HPI from 3/3/2025  Jocelyn presents for evaluation of bilateral knee pain following a fall  3 days ago. She tripped over a lip in the street while walking to [employer], falling forward and landing on both knees. She also sustained minor abrasions on both hands from catching herself. The right knee appears more severely affected. Her primary complaint is difficulty getting up from a seated position, particularly from lower surfaces like a commode. She reports minimal discomfort once standing or sitting, but the transition between positions is challenging. Stairs and inclines are especially problematic. She denies any numbness or tingling in her feet. She reports swelling in both knees, more pronounced in the right. Initially, she experienced tightness behind the knee, which has since subsided. She also mentions soreness and stiffness in her upper left shin area. For pain management, she has been taking extra-strength Tylenol, two tablets every six hours, which has been helpful. She also took ibuprofen once for inflammation. The first night after the fall, she had difficulty sleeping due to pain with movement, but sleep has improved since then. This injury is significantly impacting her daily activities. She is unable to mow her lawn or perform her usual household tasks since the incident. She is concerned about an upcoming trip to [place] on the 24th, which she has been looking forward to for months.   Jocelyn denies any facial injuries, or injuries to other parts of her body besides her knees and hands. She denies any balance issues or difficulty walking once standing. Torn meniscus in the right knee: Treated by Dr. Hoffmann.    Past Medical History:   Past Medical History:   Diagnosis Date    Hypertension     Thyroid disease      No past surgical history on file.  No family history on file.  Social History[1]  Medication List with Changes/Refills   Current Medications    AMLODIPINE (NORVASC) 2.5 MG TABLET    Take 2.5 mg by mouth.    ASPIRIN (ECOTRIN) 81 MG EC TABLET    Take 81 mg by mouth.    DICLOFENAC  SODIUM (VOLTAREN) 1 % GEL    Apply 2 g topically 3 (three) times daily.    DIPHENHYDRAMINE (BENADRYL) 25 MG CAPSULE    Take 25 mg by mouth nightly as needed.    GUAIFENESIN (MUCINEX) 600 MG 12 HR TABLET    Take 600 mg by mouth daily as needed.    LATANOPROST 0.005 % OPHTHALMIC SOLUTION    Place into both eyes.    LEVOTHYROXINE (SYNTHROID) 25 MCG TABLET    Take 25 mcg by mouth.    MULTIVITAMIN WITH MINERALS TABLET    Take 1 tablet by mouth once daily.    TRIAMCINOLONE ACETONIDE 0.1% (KENALOG) 0.1 % CREAM    Apply topically daily as needed.    VITAMIN D (VITAMIN D3) 1000 UNITS TAB    Take 1,000 Units by mouth.     Review of patient's allergies indicates:   Allergen Reactions    Brimonidine Rash     ROS    Physical Exam:   Body mass index is 23.04 kg/m².  There were no vitals filed for this visit.   GENERAL: Well appearing, appropriate for stated age, no acute distress.  CARDIOVASCULAR: Pulses regular by peripheral palpation.  PULMONARY: Respirations are even and non-labored.  NEURO: Awake, alert, and oriented x 3.  PSYCH: Mood & affect are appropriate.  HEENT: Head is normocephalic and atraumatic.  Ortho/SPM Exam    Right Knee Exam      Inspection   Effusion: present     Tenderness   The patient is tender to palpation of the patella.     Range of Motion   The patient has normal right knee ROM.  Extension:  0   Flexion:  120      Comments:  No extension lag  No step off deformity on patella     Intact EHL, FHL, gastrocsoleus, and tibialis anterior. Sensation intact to light touch in superficial peroneal, deep peroneal, tibial, sural, and saphenous nerve distributions. Foot warm and well perfused with capillary refill of less than 2 seconds and palpable pedal pulses.        Left Knee Exam      Inspection   Effusion: present     Tenderness   The patient tender to palpation of the patella.     Range of Motion   The patient has normal left knee ROM.  Extension:  0   Flexion:  120      Comments:  No extension lag  No step  "off deformity on patella  Intact EHL, FHL, gastrocsoleus, and tibialis anterior. Sensation intact to light touch in superficial peroneal, deep peroneal, tibial, sural, and saphenous nerve distributions. Foot warm and well perfused with capillary refill of less than 2 seconds and palpable pedal pulses.        Muscle Strength   Right Lower Extremity   Hip Abduction: 5/5   Quadriceps:  5/5   Hamstrin/5   Left Lower Extremity   Hip Abduction: 5/5   Quadriceps:  5/5   Hamstrin/5     Physical Exam    Left Knee: Left knee: Straightens out well.  Right Knee: RIGHT KNEE: SLIGHT PUFFINESS. RIGHT KNEE: KNOT OR BRUISE PRESENT. RIGHT KNEE: UNABLE TO STRAIGHTEN FULLY.  IMAGING:  - CT of both knees: bilateral patella fractures. The right patella fracture goes through the kneecap. The left patella fracture is described as "pretty good" and goes across the kneecap.  - X-ray of the left knee: showed a possible fracture, but was not conclusive, leading to the CT being ordered             Imaging:    US Lower Extremity Veins Right  Narrative: EXAMINATION:  US LOWER EXTREMITY VEINS RIGHT    CLINICAL HISTORY:  Nondisplaced transverse fracture of right patella, initial encounter for closed fracture    TECHNIQUE:  Duplex and color flow Doppler evaluation of the right lower extremity veins was performed.    COMPARISON:  None    FINDINGS:  Thigh veins: The right common femoral, femoral, popliteal, proximal medial saphenous, and deep femoral veins are patent and free of thrombus. The veins are normally compressible and have normal phasic flow and augmentation response.    Calf veins: The paired peroneal and posterior tibial calf veins are patent.  Impression: No evidence of deep venous thrombosis in the right lower extremity.    Electronically signed by: Louis Patrick DO  Date:    2025  Time:    15:46        Relevant imaging results reviewed and interpreted by me, discussed with the patient and / or family today.     Other " Tests:       Assessment & Plan    PLAN SUMMARY:   Continue vitamin D supplementation   Continue aspirin   Unlock braces when sitting or not weight-bearing   Lock both knee braces straight when walking or weight-bearing   Use walker when ambulating   Continue moving ankles and feet   Avoid standing in shower without braces   No driving for at least 6 weeks   Ordered ultrasound of calf to check for blood clots   Ordered new left knee brace to be locked straight   Follow up in 1 week for more x-rays    OTHER MEDICAL MANAGEMENT:   Continue taking vitamin D.    FOLLOW-UP:   Follow up in 1 week for more x-rays.             Patient Instructions   Assessment:  Jocelyn Hutton is a  84 y.o. 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    Closed nondisplaced transverse fracture of right patella, initial encounter Yes    Closed nondisplaced transverse fracture of left patella, initial encounter           Plan:  Continue TROM brace on both knees, TROM fitted for left knee today. Needs to be locked in extension when walking or any weight bearing activities,  okay to bend knees slightly as tolerated when sitting and no activity    Continue to use walker and limited weight bearing through Right knee   Avoid active knee extension on both knees.   Continue Home health for assistants with ADL especially bathroom  Continue Baby Asprin a day to reduce risk of blood clots as well as vitamin D supplementation  Advised US of right calf to rule out DVT, ordered and scheduled for afternoon of 3/6/2025  Measured and ordered bilateral ICARUS ascender patella  braces       Follow-up: 1 weeks with XR Please reach out to us sooner if there are any problems between now and then.    About Dr. Shun Hoffmann's Research &  Publications    Give us Feedback:   Google: Leave Google Review  Healthgrades: Leave Healthgrades Review    After Hours Number: [phone]        Provider Note/Medical Decision Making: 10 minutes were spent sizing, fitting, and educating regarding durable medical equipment by Dr. Chapincito Hoffmann and his assistant under his direction today.  CPT 72704.       I discussed worrisome and red flag signs and symptoms with the patient. The patient expressed understanding and agreed to alert me immediately or to go to the emergency room if they experience any of these.   Treatment plan was developed with input from the patient/family, and they expressed understanding and agreement with the plan. All questions were answered today.          Chapincito Hoffmann MD  Orthopaedic Surgery & Sports Medicine       Disclaimer: This note was prepared using a voice recognition system and is likely to have sound alike errors within the text.     This note was generated with the assistance of ambient listening technology. Verbal consent was obtained by the patient and accompanying visitor(s) for the recording of patient appointment to facilitate this note. I attest to having reviewed and edited the generated note for accuracy, though some syntax or spelling errors may persist. Please contact the author of this note for any clarification.           [1]   Social History  Socioeconomic History    Marital status:    Tobacco Use    Smoking status: Never    Smokeless tobacco: Never   Substance and Sexual Activity    Alcohol use: Not Currently    Drug use: Never    Sexual activity: Not Currently     Social Drivers of Health     Financial Resource Strain: Low Risk  (3/5/2025)    Overall Financial Resource Strain (CARDIA)     Difficulty of Paying Living Expenses: Not hard at all   Food Insecurity: No Food Insecurity (3/5/2025)    Hunger Vital Sign     Worried About Running Out of Food in the Last Year: Never true     Ran Out of Food in  the Last Year: Never true   Transportation Needs: No Transportation Needs (3/5/2025)    PRAPARE - Transportation     Lack of Transportation (Medical): No     Lack of Transportation (Non-Medical): No   Physical Activity: Patient Declined (3/5/2025)    Exercise Vital Sign     Days of Exercise per Week: Patient declined     Minutes of Exercise per Session: Patient declined   Stress: No Stress Concern Present (3/5/2025)    [employer] - Occupational Stress Questionnaire     Feeling of Stress : Not at all   Housing Stability: Low Risk  (3/5/2025)    Housing Stability Vital Sign     Unable to Pay for Housing in the Last Year: No     Number of Times Moved in the Last Year: 0     Homeless in the Last Year: No

## 2025-03-06 NOTE — PATIENT INSTRUCTIONS
Assessment:  Jocelyn Hutton is a  84 y.o. 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    Closed nondisplaced transverse fracture of right patella, initial encounter Yes    Closed nondisplaced transverse fracture of left patella, initial encounter           Plan:  Continue TROM brace on both knees, TROM fitted for left knee today. Needs to be locked in extension when walking or any weight bearing activities,  okay to bend knees slightly as tolerated when sitting and no activity    Continue to use walker and limited weight bearing through Right knee   Avoid active knee extension on both knees.   Continue Home health for assistants with ADL especially bathroom  Continue Baby Asprin a day to reduce risk of blood clots as well as vitamin D supplementation  Advised US of right calf to rule out DVT, ordered and scheduled for afternoon of 3/6/2025  Measured and ordered bilateral ICARUS ascender patella  braces       Follow-up: 1 weeks with XR Please reach out to us sooner if there are any problems between now and then.    About Dr. Shun Hoffmann's Research & Publications    Give us Feedback:   Google: Leave Google Review  Healthgrades: Leave Healthgrades Review    After Hours Number: [phone]

## 2025-03-13 ENCOUNTER — OFFICE VISIT (OUTPATIENT)
Dept: SPORTS MEDICINE | Facility: CLINIC | Age: 85
End: 2025-03-13
Payer: MEDICARE

## 2025-03-13 ENCOUNTER — HOSPITAL ENCOUNTER (OUTPATIENT)
Dept: RADIOLOGY | Facility: HOSPITAL | Age: 85
Discharge: HOME | End: 2025-03-13
Attending: ORTHOPAEDIC SURGERY
Payer: MEDICARE

## 2025-03-13 VITALS — HEIGHT: 63 IN | WEIGHT: 130.06 LBS | BODY MASS INDEX: 23.04 KG/M2

## 2025-03-13 DIAGNOSIS — S82.034D CLOSED NONDISPLACED TRANSVERSE FRACTURE OF RIGHT PATELLA WITH ROUTINE HEALING, SUBSEQUENT ENCOUNTER: ICD-10-CM

## 2025-03-13 DIAGNOSIS — M25.562 ACUTE PAIN OF BOTH KNEES: ICD-10-CM
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
## 2025-03-13 DIAGNOSIS — M25.562 ACUTE PAIN OF BOTH KNEES: Primary | ICD-10-CM

## 2025-03-13 DIAGNOSIS — S82.035D CLOSED NONDISPLACED TRANSVERSE FRACTURE OF LEFT PATELLA WITH ROUTINE HEALING, SUBSEQUENT ENCOUNTER: ICD-10-CM

## 2025-03-13 DIAGNOSIS — M25.561 ACUTE PAIN OF BOTH KNEES: ICD-10-CM

## 2025-03-13 DIAGNOSIS — M25.561 ACUTE PAIN OF BOTH KNEES: Primary | ICD-10-CM

## 2025-03-13 PROCEDURE — 99214 OFFICE O/P EST MOD 30 MIN: CPT | Mod: S$GLB,,, | Performed by: ORTHOPAEDIC SURGERY

## 2025-03-13 PROCEDURE — 1101F PT FALLS ASSESS-DOCD LE1/YR: CPT | Mod: CPTII,S$GLB,, | Performed by: ORTHOPAEDIC SURGERY

## 2025-03-13 PROCEDURE — 99999 PR PBB SHADOW E&M-EST. PATIENT-LVL III: CPT | Mod: PBBFAC,,, | Performed by: ORTHOPAEDIC SURGERY

## 2025-03-13 PROCEDURE — 73564 X-RAY EXAM KNEE 4 OR MORE: CPT | Mod: 26,50,, | Performed by: RADIOLOGY

## 2025-03-13 PROCEDURE — 3288F FALL RISK ASSESSMENT DOCD: CPT | Mod: CPTII,S$GLB,, | Performed by: ORTHOPAEDIC SURGERY

## 2025-03-13 PROCEDURE — 73564 X-RAY EXAM KNEE 4 OR MORE: CPT | Mod: TC,50,PN

## 2025-03-13 PROCEDURE — 1159F MED LIST DOCD IN RCRD: CPT | Mod: CPTII,S$GLB,, | Performed by: ORTHOPAEDIC SURGERY

## 2025-03-14 NOTE — PROGRESS NOTES
"      Patient ID: Jocelyn Hutton  YOB: 1940  MRN: [id number]    Chief Complaint: Pain of the Right Knee and Pain of the Left Knee    Referred By: Makenzie Carvajal     History of Present Illness: Jocelyn Hutton is a  84 y.o. 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 She reports using a walker for mobility, which has surprised her care team. She mentions having a minor procedure done on her leg. Her current symptoms feel like a bruise. She states, "It does not hurt, it's just like I have a bruise." She has been using knee braces on both legs, which she locks when walking or sleeping, and unlocks when sitting.    A therapist named Ana María visited yesterday and examined her whole leg and groin area, finding no issues. Jocelyn has started home health care and has assistive devices at home, including a high chair for the bed and a chair for the shower. Her house is not very big, making it easier to navigate. She expresses some anxiety about using the walker, noting, "It's different. I'm not used to it yet. It's concerning." Even her dog seems apprehensive about it.    Jocelyn denies experiencing pain in her calf. Therapy with Ana María the therapist: Yesterday, included treatment on the whole leg and groin area.  Walker: Currently using for mobility assistance.  Knee braces: Currently wearing on both legs, locked when walking and sleeping.  Assistive devices: High chair for bed, chair for shower, and hose setup to assist with daily activities.    MEDICATIONS:  - Ibuprofen: Discontinued  - Baby aspirin: Daily  - Tylenol (Equate blend): Started     "   Recall from 3/6/25: Jocelyn presents for follow-up after sustaining bilateral patella fractures from a fall on Saturday. She reports feeling great overall but states she can hardly walk. Her knees are more sensitive, possibly due to reduced swelling. She notes a knot or bruise on her leg, which she believes may be from the brace or the fall. Her knees are sore to touch, but she feels she might be okay without the braces. She can straighten her left leg adequately, but the right leg has limited mobility. She denies pain when attempting to hold her leg straight. Jocelyn uses a walker at times, though she did not bring it to this appointment. She moves around independently at home but has difficulty getting up from a sitting position. She has been taking aspirin as instructed and vitamin D supplements. This is her first experience with broken bones. She had her first CT for this condition. She has been sleeping with her braces on and finds it more comfortable to sleep in a lounge chair. Jocelyn denies any history of blood clots. Jocelyn has been using a walker for mobility, though not consistently. Jocelyn has been wearing knee braces on both legs, with the right one locked straight and the left one allowing some bending. Jocelyn has been doing ankle and foot exercises.       Recall HPI from 3/3/2025  Jocelyn presents for evaluation of bilateral knee pain following a fall 3 days ago. She tripped over a lip in the street while walking to [place], falling forward and landing on both knees. She also sustained minor abrasions on both hands from catching herself. The right knee appears more severely affected. Her primary complaint is difficulty getting up from a seated position, particularly from lower surfaces like a commode. She reports minimal discomfort once standing or sitting, but the transition between positions is challenging. Stairs and inclines are especially problematic. She denies any numbness or tingling in her feet.  She reports swelling in both knees, more pronounced in the right. Initially, she experienced tightness behind the knee, which has since subsided. She also mentions soreness and stiffness in her upper left shin area. For pain management, she has been taking extra-strength Tylenol, two tablets every six hours, which has been helpful. She also took ibuprofen once for inflammation. The first night after the fall, she had difficulty sleeping due to pain with movement, but sleep has improved since then. This injury is significantly impacting her daily activities. She is unable to mow her lawn or perform her usual household tasks since the incident. She is concerned about an upcoming trip to [place] on the 24th, which she has been looking forward to for months.   Jocelyn denies any facial injuries, or injuries to other parts of her body besides her knees and hands. She denies any balance issues or difficulty walking once standing. Torn meniscus in the right knee: Treated by Dr. Hoffmann.  Past Medical History:   Past Medical History:   Diagnosis Date    Hypertension     Thyroid disease      History reviewed. No pertinent surgical history.  No family history on file.  Social History[1]  Medication List with Changes/Refills   Current Medications    AMLODIPINE (NORVASC) 2.5 MG TABLET    Take 2.5 mg by mouth.    ASPIRIN (ECOTRIN) 81 MG EC TABLET    Take 81 mg by mouth.    DICLOFENAC SODIUM (VOLTAREN) 1 % GEL    Apply 2 g topically 3 (three) times daily.    DIPHENHYDRAMINE (BENADRYL) 25 MG CAPSULE    Take 25 mg by mouth nightly as needed.    GUAIFENESIN (MUCINEX) 600 MG 12 HR TABLET    Take 600 mg by mouth daily as needed.    LATANOPROST 0.005 % OPHTHALMIC SOLUTION    Place into both eyes.    LEVOTHYROXINE (SYNTHROID) 25 MCG TABLET    Take 25 mcg by mouth.    MULTIVITAMIN WITH MINERALS TABLET    Take 1 tablet by mouth once daily.    TRIAMCINOLONE ACETONIDE 0.1% (KENALOG) 0.1 % CREAM    Apply topically daily as needed.    VITAMIN  D (VITAMIN D3) 1000 UNITS TAB    Take 1,000 Units by mouth.     Review of patient's allergies indicates:   Allergen Reactions    Brimonidine Rash     ROS    Physical Exam:   Body mass index is 23.04 kg/m².  There were no vitals filed for this visit.   GENERAL: Well appearing, appropriate for stated age, no acute distress.  CARDIOVASCULAR: Pulses regular by peripheral palpation.  PULMONARY: Respirations are even and non-labored.  NEURO: Awake, alert, and oriented x 3.  PSYCH: Mood & affect are appropriate.  HEENT: Head is normocephalic and atraumatic.            Right Knee Exam     Inspection   Effusion: present    Tenderness   The patient is tender to palpation of the patella.    Range of Motion   Extension:  0 (no extension lag)   Flexion:  90     Other   Sensation: normal    Comments:  Intact EHL, FHL, gastrocsoleus, and tibialis anterior. Sensation intact to light touch in superficial peroneal, deep peroneal, tibial, sural, and saphenous nerve distributions. Foot warm and well perfused with capillary refill of less than 2 seconds and palpable pedal pulses.      Left Knee Exam     Inspection   Effusion: absent    Tenderness   The patient tender to palpation of the patella.    Range of Motion   Left knee extension grade: no extension lag.   Flexion:  90     Other   Sensation: normal    Muscle Strength   Right Lower Extremity   Hip Abduction: 5/5   Quadriceps:  4/5   Hamstrin/5   Left Lower Extremity   Hip Abduction: 5/5   Quadriceps:  4/5   Hamstrin/5       Physical Exam    IMAGING:  - X-rays: Bilateral patella fracture still present on both sides, with no significant changes noted           Imaging:    X-ray Knee Ortho Bilateral with Flexion  Narrative: EXAMINATION:  XR KNEE ORTHO BILAT WITH FLEXION    CLINICAL HISTORY:  Pain in right knee    TECHNIQUE:  AP standing of both knees, PA flexion standing views of both knees, and Merchant views of both knees were performed.  Lateral views of both knees were  also performed.    COMPARISON:  03/03/2025    FINDINGS:  Osteopenia.  Degenerative findings noted bilaterally most prevalent within the right knee lateral compartment including joint space loss.  Small right knee suprapatellar joint effusion noted.  Nondisplaced right patellar midpole fracture noted.  Nondisplaced left patellar fracture also present.  See recent CT exam.  Impression: As above    Electronically signed by: Armando Guadalupe MD  Date:    03/13/2025  Time:    11:07      Relevant imaging results reviewed and interpreted by me, discussed with the patient and / or family today.     Other Tests:     Assessment & Plan    PLAN SUMMARY:   Started Tylenol for pain management   Use walker for mobility assistance   Keep braces locked on both sides when walking and sleeping   Follow-up in 3 weeks    BILATERAL PATELLAR FRACTURE:   Keep braces locked on both sides when walking and when sleeping.   Started Tylenol for pain.    DEPENDENCE ON ENABLING DEVICES:   Use walker for mobility.    FOLLOW-UP:   Follow up in 3 weeks.         Patient Instructions   Assessment:  Jocelyn Hutton is a  84 y.o. 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    Acute pain of both knees Yes    Fall, subsequent encounter     Closed nondisplaced transverse fracture of left patella with routine healing, subsequent encounter     Closed nondisplaced transverse fracture of right patella with routine healing, subsequent encounter           Plan:  Continue TROM brace on both knees, Needs to be locked in extension when walking or any weight bearing activities,  okay to bend knees slightly as tolerated when sitting and no activity    Continue to use walker and limited weight bearing through Right knee   Avoid active knee extension on  both knees.   Continue Home health for assistants with ADL especially bathroom  Continue Baby Asprin a day to reduce risk of blood clots as well as vitamin D supplementation  Apply Voltaren OTC Cream to the affected area twice daily.      Follow-up: 3 weeks with XR. Please reach out to us sooner if there are any problems between now and then.    About Dr. Shun Hoffmann's Research & Publications    Give us Feedback:   Google: Leave Google Review  Healthgrades: Leave Healthgrades Review    After Hours Number: [phone]        Provider Note/Medical Decision Making:  With no further displacement of fracture.  Reviewed and interpreted today's imaging and discussed this with the patient.  We will continue managing attempting to this non operatively and finding the right balance between mobility but also protecting fractures to provide healing.  Her extensor mechanism is intact for both.  Counseled her about fall risk.    I discussed worrisome and red flag signs and symptoms with the patient. The patient expressed understanding and agreed to alert me immediately or to go to the emergency room if they experience any of these. Treatment plan was developed with input from the patient/family, and they expressed understanding and agreement with the plan. All questions were answered today.          Chapincito Hoffmann MD  Orthopaedic Surgery & Sports Medicine       Disclaimer: This note was prepared using a voice recognition system and is likely to have sound alike errors within the text.     This note was generated with the assistance of ambient listening technology. Verbal consent was obtained by the patient and accompanying visitor(s) for the recording of patient appointment to facilitate this note. I attest to having reviewed and edited the generated note for accuracy, though some syntax or spelling errors may persist. Please contact the author of this note for any clarification.    I, Milagros Denny, acted  as a scribe for Chapincito Hoffmann MD for the duration of this office visit.         [1]   Social History  Socioeconomic History    Marital status:    Tobacco Use    Smoking status: Never    Smokeless tobacco: Never   Substance and Sexual Activity    Alcohol use: Not Currently    Drug use: Never    Sexual activity: Not Currently     Social Drivers of Health     Financial Resource Strain: Low Risk  (3/5/2025)    Overall Financial Resource Strain (CARDIA)     Difficulty of Paying Living Expenses: Not hard at all   Food Insecurity: No Food Insecurity (3/5/2025)    Hunger Vital Sign     Worried About Running Out of Food in the Last Year: Never true     Ran Out of Food in the Last Year: Never true   Transportation Needs: No Transportation Needs (3/5/2025)    PRAPARE - Transportation     Lack of Transportation (Medical): No     Lack of Transportation (Non-Medical): No   Physical Activity: Patient Declined (3/5/2025)    Exercise Vital Sign     Days of Exercise per Week: Patient declined     Minutes of Exercise per Session: Patient declined   Stress: No Stress Concern Present (3/5/2025)    [employer] [place] [employer] - Occupational Stress Questionnaire     Feeling of Stress : Not at all   Housing Stability: Low Risk  (3/5/2025)    Housing Stability Vital Sign     Unable to Pay for Housing in the Last Year: No     Number of Times Moved in the Last Year: 0     Homeless in the Last Year: No

## 2025-04-03 ENCOUNTER — OFFICE VISIT (OUTPATIENT)
Dept: SPORTS MEDICINE | Facility: CLINIC | Age: 85
End: 2025-04-03
Payer: MEDICARE

## 2025-04-03 ENCOUNTER — HOSPITAL ENCOUNTER (OUTPATIENT)
Dept: RADIOLOGY | Facility: HOSPITAL | Age: 85
Discharge: HOME | End: 2025-04-03
Attending: ORTHOPAEDIC SURGERY
Payer: MEDICARE

## 2025-04-03 VITALS — WEIGHT: 130.06 LBS | HEIGHT: 63 IN | BODY MASS INDEX: 23.04 KG/M2

## 2025-04-03 DIAGNOSIS — S82.034D CLOSED NONDISPLACED TRANSVERSE FRACTURE OF RIGHT PATELLA WITH ROUTINE HEALING, SUBSEQUENT ENCOUNTER: ICD-10-CM

## 2025-04-03 DIAGNOSIS — M25.562 ACUTE PAIN OF BOTH KNEES: Primary | ICD-10-CM

## 2025-04-03 DIAGNOSIS — S82.035D CLOSED NONDISPLACED TRANSVERSE FRACTURE OF LEFT PATELLA WITH ROUTINE HEALING, SUBSEQUENT ENCOUNTER: ICD-10-CM

## 2025-04-03 DIAGNOSIS — M25.561 ACUTE PAIN OF BOTH KNEES: ICD-10-CM

## 2025-04-03 DIAGNOSIS — M25.562 ACUTE PAIN OF BOTH KNEES: ICD-10-CM

## 2025-04-03 DIAGNOSIS — M25.561 ACUTE PAIN OF BOTH KNEES: Primary | ICD-10-CM

## 2025-04-03 PROCEDURE — 99999 PR PBB SHADOW E&M-EST. PATIENT-LVL III: CPT | Mod: PBBFAC,,, | Performed by: ORTHOPAEDIC SURGERY

## 2025-04-03 PROCEDURE — 99214 OFFICE O/P EST MOD 30 MIN: CPT | Mod: S$GLB,,, | Performed by: ORTHOPAEDIC SURGERY

## 2025-04-03 PROCEDURE — 73564 X-RAY EXAM KNEE 4 OR MORE: CPT | Mod: TC,50,PN

## 2025-04-03 PROCEDURE — 73564 X-RAY EXAM KNEE 4 OR MORE: CPT | Mod: 26,50,, | Performed by: RADIOLOGY

## 2025-04-03 PROCEDURE — 1159F MED LIST DOCD IN RCRD: CPT | Mod: CPTII,S$GLB,, | Performed by: ORTHOPAEDIC SURGERY

## 2025-04-03 PROCEDURE — 1126F AMNT PAIN NOTED NONE PRSNT: CPT | Mod: CPTII,S$GLB,, | Performed by: ORTHOPAEDIC SURGERY

## 2025-04-03 NOTE — PATIENT INSTRUCTIONS
Assessment:  Jocelyn Hutton is a  84 y.o. 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    Acute pain of both knees Yes    Closed nondisplaced transverse fracture of left patella with routine healing, subsequent encounter     Closed nondisplaced transverse fracture of right patella with routine healing, subsequent encounter      Plan:  Continue TROM brace on both knees, Needs to be locked in extension when walking or any weight bearing activities,  okay to bend knees slightly as tolerated when sitting and no activity    Continue to use walker and limited weight bearing through Right knee   Avoid active knee extension on both knees.   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 Please reach out to us sooner if there are any problems between now and then.    About Dr. Shun Hoffmann's Research & Publications    Give us Feedback:   Google: Leave Google Review  Healthgrades: Leave Healthgrades Review    After Hours Number: [phone]

## 2025-04-04 NOTE — PROGRESS NOTES
Patient ID: Jocelyn Hutton  YOB: 1940  MRN: [id number]    Chief Complaint: Injury of the Right Knee and Injury of the Left Knee    Referred By: Makenzie Carvajal     History of Present Illness: Jocelyn Hutton is a  84 y.o. 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    HPI:  Jocelyn presents for follow-up approximately 5 weeks after sustaining bilateral patella fractures on March 1st. Her left knee is functioning well, while the right knee remains slightly stiff. She denies any recent issues or pain in either knee. She uses a walker and knee braces for ambulation, which she continues to wear. Home health visits occur once a week, with prescribed exercises performed daily, including sitting up and down while applying pressure to the knee, curling while lying on her abdomen, and marching in place. She reports no pain with these exercises but notes occasional brief, sharp sensations in both knees, described as brief, intermittent sensations and attributed to nerve activity. These sensations are not localized to any specific area and last only a second. She denies any prolonged pain or discomfort. She expresses boredom from staying at home and mentions walking on her carport for fresh air. She denies any swelling in her knees or feet, noting that previous puffiness in her toes has resolved. She mentions past irritation from the knee brace rubbing against her calf, which has since resolved after adding foam padding. She denies any formal medical diagnoses. She has been doing exercises given by home health, including sitting up and down while putting pressure on the knee, curling exercises while  "lying on stomach, and marching in place. She reports doing these exercises every day and believes they have helped.  She has been using a walker and knee braces for mobility assistance.  She has been wearing knee braces continuously, even when sitting at home.    ROS:  Cardiovascular: -lower extremity edema  Musculoskeletal: -joint pain, +joint stiffness, -limb swelling  Neurological: +tingling, +nerve pain, +shooting pain sensation     Recall HPI from 03/13/2025  Jocelyn is being seen for follow-up of bilateral patella fractures that occurred 13 days ago. She reports using a walker for mobility, which has surprised her care team. She mentions having a minor procedure done on her leg. Her current symptoms feel like a bruise. She states, "It does not hurt, it's just like I have a bruise." She has been using knee braces on both legs, which she locks when walking or sleeping, and unlocks when sitting. A therapist named Ana María visited yesterday and examined her whole leg and groin area, finding no issues. Jocelyn has started home health care and has assistive devices at home, including a high chair for the bed and a chair for the shower. Her house is not very big, making it easier to navigate. She expresses some anxiety about using the walker, noting, "It's different. I'm not used to it yet. It's concerning." Even her dog seems apprehensive about it.     Jocelyn denies experiencing pain in her calf. Therapy with Ana María the therapist: Yesterday, included treatment on the whole leg and groin area.  Walker: Currently using for mobility assistance.  Knee braces: Currently wearing on both legs, locked when walking and sleeping.  Assistive devices: High chair for bed, chair for shower, and hose setup to assist with daily activities.       Recall from 3/6/25: Jocelyn presents for follow-up after sustaining bilateral patella fractures from a fall on Saturday. She reports feeling great overall but states she can hardly walk. Her knees " are more sensitive, possibly due to reduced swelling. She notes a knot or bruise on her leg, which she believes may be from the brace or the fall. Her knees are sore to touch, but she feels she might be okay without the braces. She can straighten her left leg adequately, but the right leg has limited mobility. She denies pain when attempting to hold her leg straight. Jocelyn uses a walker at times, though she did not bring it to this appointment. She moves around independently at home but has difficulty getting up from a sitting position. She has been taking aspirin as instructed and vitamin D supplements. This is her first experience with broken bones. She had her first CT for this condition. She has been sleeping with her braces on and finds it more comfortable to sleep in a lounge chair. Jocelyn denies any history of blood clots. Jocelyn has been using a walker for mobility, though not consistently. Jocelyn has been wearing knee braces on both legs, with the right one locked straight and the left one allowing some bending. Jocelyn has been doing ankle and foot exercises.        Recall HPI from 3/3/2025  Jocelyn presents for evaluation of bilateral knee pain following a fall 3 days ago. She tripped over a lip in the street while walking to [place], falling forward and landing on both knees. She also sustained minor abrasions on both hands from catching herself. The right knee appears more severely affected. Her primary complaint is difficulty getting up from a seated position, particularly from lower surfaces like a commode. She reports minimal discomfort once standing or sitting, but the transition between positions is challenging. Stairs and inclines are especially problematic. She denies any numbness or tingling in her feet. She reports swelling in both knees, more pronounced in the right. Initially, she experienced tightness behind the knee, which has since subsided. She also mentions soreness and stiffness in her  upper left shin area. For pain management, she has been taking extra-strength Tylenol, two tablets every six hours, which has been helpful. She also took ibuprofen once for inflammation. The first night after the fall, she had difficulty sleeping due to pain with movement, but sleep has improved since then. This injury is significantly impacting her daily activities. She is unable to mow her lawn or perform her usual household tasks since the incident. She is concerned about an upcoming trip to [place] on the 24th, which she has been looking forward to for months.   Jocelyn denies any facial injuries, or injuries to other parts of her body besides her knees and hands. She denies any balance issues or difficulty walking once standing. Torn meniscus in the right knee: Treated by Dr. Hoffmann.    Past Medical History:   Past Medical History:   Diagnosis Date    Hypertension     Thyroid disease      History reviewed. No pertinent surgical history.  No family history on file.  Social History[1]  Medication List with Changes/Refills   Current Medications    AMLODIPINE (NORVASC) 2.5 MG TABLET    Take 2.5 mg by mouth.    ASPIRIN (ECOTRIN) 81 MG EC TABLET    Take 81 mg by mouth.    DICLOFENAC SODIUM (VOLTAREN) 1 % GEL    Apply 2 g topically 3 (three) times daily.    DIPHENHYDRAMINE (BENADRYL) 25 MG CAPSULE    Take 25 mg by mouth nightly as needed.    GUAIFENESIN (MUCINEX) 600 MG 12 HR TABLET    Take 600 mg by mouth daily as needed.    LATANOPROST 0.005 % OPHTHALMIC SOLUTION    Place into both eyes.    LEVOTHYROXINE (SYNTHROID) 25 MCG TABLET    Take 25 mcg by mouth.    MULTIVITAMIN WITH MINERALS TABLET    Take 1 tablet by mouth once daily.    TRIAMCINOLONE ACETONIDE 0.1% (KENALOG) 0.1 % CREAM    Apply topically daily as needed.    VITAMIN D (VITAMIN D3) 1000 UNITS TAB    Take 1,000 Units by mouth.     Review of patient's allergies indicates:   Allergen Reactions    Brimonidine Rash     ROS    Physical Exam:   Body mass index  is 23.04 kg/m².  There were no vitals filed for this visit.   GENERAL: Well appearing, appropriate for stated age, no acute distress.  CARDIOVASCULAR: Pulses regular by peripheral palpation.  PULMONARY: Respirations are even and non-labored.  NEURO: Awake, alert, and oriented x 3.  PSYCH: Mood & affect are appropriate.  HEENT: Head is normocephalic and atraumatic.  Ortho/SPM Exam  Right Knee Exam      Inspection   Effusion: present     Tenderness   The patient is tender to palpation of the patella.     Range of Motion   Extension:  0 (no extension lag)   Flexion:  90      Other   Sensation: normal     Comments:  Intact EHL, FHL, gastrocsoleus, and tibialis anterior. Sensation intact to light touch in superficial peroneal, deep peroneal, tibial, sural, and saphenous nerve distributions. Foot warm and well perfused with capillary refill of less than 2 seconds and palpable pedal pulses.        Left Knee Exam      Inspection   Effusion: absent     Tenderness   The patient tender to palpation of the patella.     Range of Motion   Left knee extension grade: no extension lag.   Flexion:  90      Other   Sensation: normal     Muscle Strength   Right Lower Extremity   Hip Abduction: 5/5   Quadriceps:  4/5   Hamstrin/5   Left Lower Extremity   Hip Abduction: 5/5   Quadriceps:  4/5   Hamstrin/5     Physical Exam    Right Knee: RIGHT KNEE STIFFNESS.  IMAGING:  - XR Bilateral Knees: The left knee fracture is now more visible compared to previous imaging, which is attributed to possible bone remodeling as part of the healing process. No specific findings mentioned for the right knee.           Imaging:    X-ray Knee Ortho Bilateral with Flexion  Narrative: EXAMINATION:  XR KNEE ORTHO BILAT WITH FLEXION    CLINICAL HISTORY:  Pain in right knee    TECHNIQUE:  AP standing of both knees, PA flexion standing views of both knees, and Merchant views of both knees were performed.  Lateral views of both knees were also  performed.    COMPARISON  03/13/2025    FINDINGS:  The medial and lateral compartment joint spaces are relatively well maintained.  Mild marginal osteophyte formation seen involving the medial compartment of the right knee and the lateral compartment of the right knee as well.  The PA standing flexion views appear to demonstrate mild-to-moderate joint space narrowing seen involving the lateral compartments bilaterally.  There is a transversely oriented fracture involving the right patella with some pre patellar soft tissue swelling and small joint effusion present.  Fracture lines are more blurred when compared to the prior exam.  Fracture fragment alignment is stable.  There is also a transversely oriented fracture involving the left patella below the equator.  This fracture line is better seen on the current exam with blurring of the fracture margins.  Fracture fragment alignment is stable.  Small right-sided suprapatellar joint effusion noted.  Impression: 1.  As above    Electronically signed by: Louis Patrick DO  Date:    04/03/2025  Time:    10:16      Relevant imaging results reviewed and interpreted by me, discussed with the patient and / or family today.     Other Tests:    Assessment & Plan    PLAN SUMMARY:   XR Knee ordered in 4 weeks to assess healing progress   Continue using walker   Continue knee braces when mobile   Continue daily home exercises   Remove knee braces when stationary   No driving   Follow-up in 4 weeks    FOLLOW-UP:   Follow up in 4 weeks.         Patient Instructions   Assessment:  Jocelyn Hutton is a  84 y.o. 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    Acute pain of both knees Yes    Closed nondisplaced transverse fracture of left patella with routine healing,  subsequent encounter     Closed nondisplaced transverse fracture of right patella with routine healing, subsequent encounter      Plan:  Continue TROM brace on both knees, Needs to be locked in extension when walking or any weight bearing activities,  okay to bend knees slightly as tolerated when sitting and no activity    Continue to use walker and limited weight bearing through Right knee   Avoid active knee extension on both knees.   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 Please reach out to us sooner if there are any problems between now and then.    About Dr. Shun Hoffmann's Research & Publications    Give us Feedback:   Google: Leave Google Review  Healthgrades: Leave Healthgrades Review    After Hours Number: [phone]        Provider Note/Medical Decision Making:     I discussed worrisome and red flag signs and symptoms with the patient. The patient expressed understanding and agreed to alert me immediately or to go to the emergency room if they experience any of these. Treatment plan was developed with input from the patient/family, and they expressed understanding and agreement with the plan. All questions were answered today.          Chapincito Hoffmann MD  Board Certified in Orthopaedic Surgery & Sports Medicine   Regional Section Head of Orthopedic Surgery & Sports Medicine  [facility]      Disclaimer: This note was prepared using a voice recognition system and is likely to have sound alike errors within the text.     This note was generated with the assistance of ambient listening technology. Verbal consent was obtained by the patient and accompanying visitor(s) for the recording of patient appointment to facilitate this note. I attest to having reviewed and edited the generated note for accuracy, though some syntax or spelling errors may persist. Please contact the  author of this note for any clarification.           [1]   Social History  Socioeconomic History    Marital status:    Tobacco Use    Smoking status: Never    Smokeless tobacco: Never   Substance and Sexual Activity    Alcohol use: Not Currently    Drug use: Never    Sexual activity: Not Currently     Social Drivers of Health     Financial Resource Strain: Low Risk  (3/5/2025)    Overall Financial Resource Strain (CARDIA)     Difficulty of Paying Living Expenses: Not hard at all   Food Insecurity: No Food Insecurity (3/5/2025)    Hunger Vital Sign     Worried About Running Out of Food in the Last Year: Never true     Ran Out of Food in the Last Year: Never true   Transportation Needs: No Transportation Needs (3/5/2025)    PRAPARE - Transportation     Lack of Transportation (Medical): No     Lack of Transportation (Non-Medical): No   Physical Activity: Patient Declined (3/5/2025)    Exercise Vital Sign     Days of Exercise per Week: Patient declined     Minutes of Exercise per Session: Patient declined   Stress: No Stress Concern Present (3/5/2025)    [employer] [place] [employer] - Occupational Stress Questionnaire     Feeling of Stress : Not at all   Housing Stability: Low Risk  (3/5/2025)    Housing Stability Vital Sign     Unable to Pay for Housing in the Last Year: No     Number of Times Moved in the Last Year: 0     Homeless in the Last Year: No

## 2025-05-01 ENCOUNTER — OFFICE VISIT (OUTPATIENT)
Dept: SPORTS MEDICINE | Facility: CLINIC | Age: 85
End: 2025-05-01
Payer: MEDICARE

## 2025-05-01 ENCOUNTER — HOSPITAL ENCOUNTER (OUTPATIENT)
Dept: RADIOLOGY | Facility: HOSPITAL | Age: 85
Discharge: HOME | End: 2025-05-01
Attending: ORTHOPAEDIC SURGERY
Payer: MEDICARE

## 2025-05-01 VITALS — WEIGHT: 130.06 LBS | HEIGHT: 63 IN | BODY MASS INDEX: 23.04 KG/M2

## 2025-05-01 DIAGNOSIS — S82.035D CLOSED NONDISPLACED TRANSVERSE FRACTURE OF LEFT PATELLA WITH ROUTINE HEALING, SUBSEQUENT ENCOUNTER: ICD-10-CM

## 2025-05-01 DIAGNOSIS — S82.034D CLOSED NONDISPLACED TRANSVERSE FRACTURE OF RIGHT PATELLA WITH ROUTINE HEALING, SUBSEQUENT ENCOUNTER: Primary | ICD-10-CM

## 2025-05-01 DIAGNOSIS — M25.561 PAIN IN BOTH KNEES, UNSPECIFIED CHRONICITY: ICD-10-CM

## 2025-05-01 DIAGNOSIS — M25.562 PAIN IN BOTH KNEES, UNSPECIFIED CHRONICITY: ICD-10-CM

## 2025-05-01 PROCEDURE — 99214 OFFICE O/P EST MOD 30 MIN: CPT | Mod: S$GLB,,, | Performed by: ORTHOPAEDIC SURGERY

## 2025-05-01 PROCEDURE — 1159F MED LIST DOCD IN RCRD: CPT | Mod: CPTII,S$GLB,, | Performed by: ORTHOPAEDIC SURGERY

## 2025-05-01 PROCEDURE — 1126F AMNT PAIN NOTED NONE PRSNT: CPT | Mod: CPTII,S$GLB,, | Performed by: ORTHOPAEDIC SURGERY

## 2025-05-01 PROCEDURE — 73564 X-RAY EXAM KNEE 4 OR MORE: CPT | Mod: 26,50,, | Performed by: RADIOLOGY

## 2025-05-01 PROCEDURE — 73564 X-RAY EXAM KNEE 4 OR MORE: CPT | Mod: TC,50,PN

## 2025-05-01 PROCEDURE — 99999 PR PBB SHADOW E&M-EST. PATIENT-LVL III: CPT | Mod: PBBFAC,,, | Performed by: ORTHOPAEDIC SURGERY

## 2025-05-01 NOTE — PATIENT INSTRUCTIONS
+Assessment:  Jocelyn Hutton is a  84 y.o. 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    Pain in both knees, unspecified chronicity     Closed nondisplaced transverse fracture of left patella with routine healing, subsequent encounter     Closed nondisplaced transverse fracture of right patella with routine healing, subsequent encounter Yes     Plan:  Okay to discontinue TROM brace on both knees, Discussed that if any knee pain persist that the braces should be worn again during ambulation  Continue to use cane for safe ambulation without braces    Avoid any impact to the lower extremity like a step down from a curb and activities that involve kneeling   Continue Home health for lower extremity rehab  Icarus ascender brace for the right knee ordered today   Return in 2 weeks with Jodi Holliday PA-C for possible release for driving     Follow-up: 2 weeks with Jodi Holliday PA-C, 6 weeks with me with new XR. Please reach out to us sooner if there are any problems between now and then.    About Dr. Shun Hoffmann's Research & Publications    Give us Feedback:   Google: Leave Google Review  Healthgrades: Leave Healthgrades Review    After Hours Number: [phone]
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
## 2025-05-15 ENCOUNTER — OFFICE VISIT (OUTPATIENT)
Dept: SPORTS MEDICINE | Facility: CLINIC | Age: 85
End: 2025-05-15
Payer: MEDICARE

## 2025-05-15 VITALS — WEIGHT: 130.06 LBS | HEIGHT: 63 IN | BODY MASS INDEX: 23.04 KG/M2
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
## 2025-05-15 DIAGNOSIS — S82.034D CLOSED NONDISPLACED TRANSVERSE FRACTURE OF RIGHT PATELLA WITH ROUTINE HEALING, SUBSEQUENT ENCOUNTER: ICD-10-CM

## 2025-05-15 DIAGNOSIS — M25.561 PAIN IN BOTH KNEES, UNSPECIFIED CHRONICITY: Primary | ICD-10-CM

## 2025-05-15 DIAGNOSIS — S82.035D CLOSED NONDISPLACED TRANSVERSE FRACTURE OF LEFT PATELLA WITH ROUTINE HEALING, SUBSEQUENT ENCOUNTER: ICD-10-CM

## 2025-05-15 DIAGNOSIS — M25.562 PAIN IN BOTH KNEES, UNSPECIFIED CHRONICITY: Primary | ICD-10-CM

## 2025-05-15 PROCEDURE — 1126F AMNT PAIN NOTED NONE PRSNT: CPT | Mod: CPTII,S$GLB,, | Performed by: PHYSICIAN ASSISTANT

## 2025-05-15 PROCEDURE — 99999 PR PBB SHADOW E&M-EST. PATIENT-LVL III: CPT | Mod: PBBFAC,,, | Performed by: PHYSICIAN ASSISTANT

## 2025-05-15 PROCEDURE — 99213 OFFICE O/P EST LOW 20 MIN: CPT | Mod: S$GLB,,, | Performed by: PHYSICIAN ASSISTANT

## 2025-05-15 PROCEDURE — 1159F MED LIST DOCD IN RCRD: CPT | Mod: CPTII,S$GLB,, | Performed by: PHYSICIAN ASSISTANT

## 2025-05-15 NOTE — PROGRESS NOTES
"        Patient ID: Jocelyn Hutton  YOB: 1940  MRN: [id number]    Chief Complaint: Injury and Pain of the Left Knee and Pain and Injury of the Right Knee      Referred By: Makenzie Carvajal    History of Present Illness: Jocelyn Hutton is a  84 y.o. 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    HPI:  Jocelyn presents for follow-up of bilateral knee injury sustained from a fall while going to [employer]. She tripped and fell forward onto both knees. The left knee now feels normal, but the right knee remains tender. She has a history of a torn meniscus in the right knee, for which she only received therapy, and reports favoring that knee even before the recent injury.    She previously used T-Jose Luis braces, which have been discontinued. She was advised to use a cane for ambulation if needed but does not currently require it. Home health services were discontinued after two visits due to her good progress. She was prescribed a special brace for her right knee but did not obtain it due to insurance not covering the cost and her feeling that she was doing well without it.    She reports driving a little bit the day before the visit, being careful not to brake hard. She has been avoiding kneeling. She mentions hearing and feeling a "bump" or crunching sensation when moving her leg in certain ways, which does not cause pain. She has been taking low-dose aspirin as prescribed by this medical assistant due to concerns about blood clots, starting around March 3rd.    She expresses a desire to attend her granddaughter's high school graduation at the [employer], which may involve navigating stairs. She typically walks in her subdivision for exercise but at a slower pace than usual.    She denies experiencing any new " pain, or having any issues with the bone spurs in her knee. She denies any history of knee replacement.    PREVIOUS TREATMENTS:  - PT for a torn meniscus in the right knee: Jocelyn learned what movements to avoid  - T-Jose Luis braces: Discontinued  - Cane for ambulation: Used as needed, not currently required  - Home health PT: Discontinued after two visits due to good progress    IMAGING:  - XR Right Knee    MEDICATIONS:  - Aspirin: 81 mg 1 tablet daily, started around March 3rd to prevent blood clots    SOCIAL HISTORY:  - Attends [employer]  - [employer] affiliation suggested ([employer] mentioned)  - Receives communion from a [employer]      ROS:  Musculoskeletal: +joint pain, +limb pain, +pain with movement  Hematologic/Lymphatic: +easy bleeding tendency         Past Medical History:   Past Medical History:   Diagnosis Date    Hypertension     Thyroid disease      History reviewed. No pertinent surgical history.  No family history on file.  Social History[1]  Medication List with Changes/Refills   Current Medications    AMLODIPINE (NORVASC) 2.5 MG TABLET    Take 2.5 mg by mouth.    ASPIRIN (ECOTRIN) 81 MG EC TABLET    Take 81 mg by mouth.    DICLOFENAC SODIUM (VOLTAREN) 1 % GEL    Apply 2 g topically 3 (three) times daily.    DIPHENHYDRAMINE (BENADRYL) 25 MG CAPSULE    Take 25 mg by mouth nightly as needed.    GUAIFENESIN (MUCINEX) 600 MG 12 HR TABLET    Take 600 mg by mouth daily as needed.    LATANOPROST 0.005 % OPHTHALMIC SOLUTION    Place into both eyes.    LEVOTHYROXINE (SYNTHROID) 25 MCG TABLET    Take 25 mcg by mouth.    MULTIVITAMIN WITH MINERALS TABLET    Take 1 tablet by mouth once daily.    TRIAMCINOLONE ACETONIDE 0.1% (KENALOG) 0.1 % CREAM    Apply topically daily as needed.    VITAMIN D (VITAMIN D3) 1000 UNITS TAB    Take 1,000 Units by mouth.     Review of patient's allergies indicates:   Allergen Reactions    Brimonidine Rash     Review of Systems   Constitutional: Negative for chills and fever.    HENT:  Negative for sore throat.    Eyes:  Negative for pain.   Cardiovascular:  Negative for chest pain and leg swelling.   Respiratory:  Negative for cough and shortness of breath.    Skin:  Negative for itching and rash.   Musculoskeletal:  Positive for joint pain and myalgias.   Gastrointestinal:  Negative for abdominal pain, nausea and vomiting.   Genitourinary:  Negative for dysuria.   Neurological:  Negative for dizziness, numbness and paresthesias.       Physical Exam:   Body mass index is 23.04 kg/m².  There were no vitals filed for this visit.   GENERAL: Well appearing, appropriate for stated age, no acute distress.  CARDIOVASCULAR: Pulses regular by peripheral palpation.  PULMONARY: Respirations are even and non-labored.  NEURO: Awake, alert, and oriented x 3.  PSYCH: Mood & affect are appropriate.  HEENT: Head is normocephalic and atraumatic.  General    Nursing note and vitals reviewed.          Right Knee Exam   Right knee exam is normal.    Inspection   Effusion: absent    Tenderness   The patient is experiencing no tenderness.     Crepitus   The patient has crepitus of the patella.    Range of Motion   Extension:  0   Flexion:  120     Tests   Ligament Examination   Lachman: normal (-1 to 2mm)   PCL-Posterior Drawer: normal (0 to 2mm)     MCL - Valgus: normal (0 to 2mm)  LCL - Varus: normal    Other   Sensation: normal    Left Knee Exam   Left knee exam is normal.    Inspection   Effusion: absent    Tenderness   The patient is experiencing no tenderness.     Crepitus   The patient has crepitus of the patella.    Range of Motion   Extension:  0   Flexion:  120     Tests   Stability   Lachman: normal (-1 to 2mm)   PCL-Posterior Drawer: normal (0 to 2mm)  MCL - Valgus: normal (0 to 2mm)  LCL - Varus: normal (0 to 2mm)    Other   Sensation: normal    Muscle Strength   Right Lower Extremity   Hip Abduction: 5/5   Quadriceps:  4/5   Hamstrin/5   Left Lower Extremity   Hip Abduction: 5/5    Quadriceps:  4/5   Hamstrin/5     Vascular Exam     Right Pulses  Dorsalis Pedis:      2+  Posterior Tibial:      2+        Left Pulses  Dorsalis Pedis:      2+  Posterior Tibial:      2+          Physical Exam    Musculoskeletal: Patellofemoral crepitus.         All compartments are soft and compressible. Calf soft non-tender. Intact EHL, FHL, gastroc soleus, and tibialis anterior. Sensation intact to light touch in superficial peroneal, deep peroneal, tibial, sural, and saphenous nerve distributions. Foot warm and well perfused with capillary refill of less than 2 seconds and palpable pedal pulses.       Imaging:    X-ray Knee Ortho Bilateral with Flexion  Narrative: EXAM: XR KNEE ORTHO BILAT WITH FLEXION    CLINICAL HISTORY: Bilateral knee pain.    Bilateral knee x-ray, 4 views.    COMPARISON: 2025.    FINDINGS:    Right knee: Subtle transverse fractures of the superior aspect patella is less conspicuous likely indicating early interval healing.  Moderate to severe compartment joint space narrowing.  Low-grade degenerative changes medial compartment with subchondral cysts and peripheral osteophytes.  Mild patellofemoral joint space narrowing.  Supporting soft tissues are normal.    Left knee: Negative for acute fracture or dislocation.  Severe lateral compartment joint space narrowing.  Medial compartment and patellofemoral compartment well preserved.  Normal supporting soft tissues.    Overall, there has been no significant interval change in comparison to prior examination.  Impression: Evidence of early interval healing of the nondisplaced patella fracture.    Bilateral degenerative changes are stable and unchanged.    Finalized on: 2025 3:24 PM By:  Kennedy Hartman MD  [facility]# [id number]      2025 15:26:[id number]     [facility]        Relevant imaging results reviewed and interpreted by me, discussed with the patient and / or family today.     Other Tests:         Patient Instructions    Assessment:  Jocelyn Hutton is a 84 y.o. 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   11 weeks s/p bilateral knee pain and fall on 3/1/25  Right knee patella fracture  Left knee patella fracture    Encounter Diagnoses   Name Primary?    Pain in both knees, unspecified chronicity Yes    Closed nondisplaced transverse fracture of left patella with routine healing, subsequent encounter     Closed nondisplaced transverse fracture of right patella with routine healing, subsequent encounter     Fall, subsequent encounter       Plan:  PT/OT:   Has discontinue home health physical therapy  Will order outpatient physical therapy for [facility]  Medications:    Patient has been on aspirin since the injury to prevent blood clots, as she has taken 21 days of sufficient blood thinner, discussed discontinuing    DME and weight bearing status:    Icarus ascender brace for the right knee was too expensive patient states that she defers a this time  Advanced Imaging:   Xrays in 6 weeks with Dr. Chapincito Hoffmann   Education:    Avoid any impact on the patella at this time such as kneeling   Return to clinic:    Recheck with Dr. Chapincito Hoffmann in 6 weeks appointment is already scheduled   Imaging needed at next follow-up: Bilateral knee XRs     Follow-up: 6 weeks with Dr. Chapincito Hoffmann . Please reach out to us sooner if there are any problems between now and then.    About Dr. Shun Hoffmann's Research & Publications    Give us Feedback:   Google: Leave Google Review  Healthgrades: Leave Healthgrades Review       Provider Note/Medical Decision Making:       I discussed worrisome and red flag signs and symptoms with the patient. The patient expressed understanding and agreed to alert me immediately or to go to the emergency room if they experience  any of these.   Treatment plan was developed with input from the patient/family, and they expressed understanding and agreement with the plan. All questions were answered today.      Jodi Parham PA-C  Sports Medicine Physician Assistant     Disclaimer: This note was prepared using a voice recognition system and is likely to have sound alike errors within the text.     This note was generated with the assistance of ambient listening technology. Verbal consent was obtained by the patient and accompanying visitor(s) for the recording of patient appointment to facilitate this note. I attest to having reviewed and edited the generated note for accuracy, though some syntax or spelling errors may persist. Please contact the author of this note for any clarification.           [1]   Social History  Socioeconomic History    Marital status:    Tobacco Use    Smoking status: Never    Smokeless tobacco: Never   Substance and Sexual Activity    Alcohol use: Not Currently    Drug use: Never    Sexual activity: Not Currently     Social Drivers of Health     Financial Resource Strain: Low Risk  (3/5/2025)    Overall Financial Resource Strain (CARDIA)     Difficulty of Paying Living Expenses: Not hard at all   Food Insecurity: No Food Insecurity (3/5/2025)    Hunger Vital Sign     Worried About Running Out of Food in the Last Year: Never true     Ran Out of Food in the Last Year: Never true   Transportation Needs: No Transportation Needs (3/5/2025)    PRAPARE - Transportation     Lack of Transportation (Medical): No     Lack of Transportation (Non-Medical): No   Physical Activity: Patient Declined (3/5/2025)    Exercise Vital Sign     Days of Exercise per Week: Patient declined     Minutes of Exercise per Session: Patient declined   Stress: No Stress Concern Present (3/5/2025)    [employer] - Occupational Stress Questionnaire     Feeling of Stress : Not at all   Housing Stability: Low Risk   (3/5/2025)    Housing Stability Vital Sign     Unable to Pay for Housing in the Last Year: No     Number of Times Moved in the Last Year: 0     Homeless in the Last Year: No

## 2025-05-15 NOTE — PATIENT INSTRUCTIONS
Assessment:  Jocelyn Hutton is a 84 y.o. 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   11 weeks s/p bilateral knee pain and fall on 3/1/25  Right knee patella fracture  Left knee patella fracture    Encounter Diagnoses   Name Primary?    Pain in both knees, unspecified chronicity Yes    Closed nondisplaced transverse fracture of left patella with routine healing, subsequent encounter     Closed nondisplaced transverse fracture of right patella with routine healing, subsequent encounter     Fall, subsequent encounter       Plan:  PT/OT:   Has discontinue home health physical therapy: patella fracture protocol  Will order outpatient physical therapy for [facility]  Medications:    Patient has been on aspirin since the injury to prevent blood clots, as she has taken 21 days of sufficient blood thinner, discussed discontinuing    DME and weight bearing status:    Icarus ascender brace for the right knee was too expensive patient states that she defers a this time  Advanced Imaging:   Xrays in 6 weeks with Dr. Chapincito Hoffmann   Education:    Avoid any impact on the patella at this time such as kneeling   Return to clinic:    Recheck with Dr. Chapincito Hoffmann in 6 weeks appointment is already scheduled   Imaging needed at next follow-up: Bilateral knee XRs     Follow-up: 6 weeks with Dr. Chapincito Hoffmann . Please reach out to us sooner if there are any problems between now and then.    About Dr. Shun Hoffmann's Research & Publications    Give us Feedback:   Google: Leave Google Review  Healthgrades: Leave Healthgrades Review

## 2025-05-16 ENCOUNTER — EXTERNAL HOME HEALTH (OUTPATIENT)
Dept: HOME HEALTH SERVICES | Facility: HOSPITAL | Age: 85
End: 2025-05-16
Payer: MEDICARE

## 2025-06-18 ENCOUNTER — OFFICE VISIT (OUTPATIENT)
Dept: SPORTS MEDICINE | Facility: CLINIC | Age: 85
End: 2025-06-18
Payer: MEDICARE

## 2025-06-18 ENCOUNTER — HOSPITAL ENCOUNTER (OUTPATIENT)
Dept: RADIOLOGY | Facility: HOSPITAL | Age: 85
Discharge: HOME | End: 2025-06-18
Attending: ORTHOPAEDIC SURGERY
Payer: MEDICARE

## 2025-06-18 VITALS — BODY MASS INDEX: 23.04 KG/M2 | HEIGHT: 63 IN | WEIGHT: 130.06 LBS

## 2025-06-18 DIAGNOSIS — S82.034D CLOSED NONDISPLACED TRANSVERSE FRACTURE OF RIGHT PATELLA WITH ROUTINE HEALING, SUBSEQUENT ENCOUNTER: ICD-10-CM

## 2025-06-18 DIAGNOSIS — S82.035D CLOSED NONDISPLACED TRANSVERSE FRACTURE OF LEFT PATELLA WITH ROUTINE HEALING, SUBSEQUENT ENCOUNTER: ICD-10-CM

## 2025-06-18 DIAGNOSIS — S82.035D CLOSED NONDISPLACED TRANSVERSE FRACTURE OF LEFT PATELLA WITH ROUTINE HEALING, SUBSEQUENT ENCOUNTER: Primary | ICD-10-CM

## 2025-06-18 PROCEDURE — 99214 OFFICE O/P EST MOD 30 MIN: CPT | Mod: S$GLB,,, | Performed by: ORTHOPAEDIC SURGERY

## 2025-06-18 PROCEDURE — 1126F AMNT PAIN NOTED NONE PRSNT: CPT | Mod: CPTII,S$GLB,, | Performed by: ORTHOPAEDIC SURGERY

## 2025-06-18 PROCEDURE — 73564 X-RAY EXAM KNEE 4 OR MORE: CPT | Mod: TC,50,PN

## 2025-06-18 PROCEDURE — 1159F MED LIST DOCD IN RCRD: CPT | Mod: CPTII,S$GLB,, | Performed by: ORTHOPAEDIC SURGERY

## 2025-06-18 PROCEDURE — 99999 PR PBB SHADOW E&M-EST. PATIENT-LVL III: CPT | Mod: PBBFAC,,, | Performed by: ORTHOPAEDIC SURGERY

## 2025-06-18 PROCEDURE — 73564 X-RAY EXAM KNEE 4 OR MORE: CPT | Mod: 26,50,, | Performed by: STUDENT IN AN ORGANIZED HEALTH CARE EDUCATION/TRAINING PROGRAM

## 2025-06-18 NOTE — PATIENT INSTRUCTIONS
Assessment:  Jocelyn Hutton is a 84 y.o. 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   3.5 months s/p bilateral knee pain and fall on 3/1/25  Right knee patella fracture  Left knee patella fracture    Encounter Diagnoses   Name Primary?    Closed nondisplaced transverse fracture of left patella with routine healing, subsequent encounter Yes    Closed nondisplaced transverse fracture of right patella with routine healing, subsequent encounter       Plan:  Continue physical [facility] at [facility] at this time working on continued strengthening of the lower extremities  Build strength endurance at this time to assist with activities of daily life   Continue to avoid any impact activities and avoid fall risk as much as possible      Follow-up: 3 months with Dr. Chapincito Hoffmann with new XR . Please reach out to us sooner if there are any problems between now and then.    About Dr. Shun Hoffmann's Research & Publications    Give us Feedback:   Google: Leave Google Review  Healthgrades: Leave Healthgrades Review
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
## 2025-06-26 ENCOUNTER — DOCUMENT SCAN (OUTPATIENT)
Dept: HOME HEALTH SERVICES | Facility: HOSPITAL | Age: 85
End: 2025-06-26
Payer: MEDICARE